# Patient Record
Sex: FEMALE | Race: WHITE | NOT HISPANIC OR LATINO | Employment: OTHER | URBAN - METROPOLITAN AREA
[De-identification: names, ages, dates, MRNs, and addresses within clinical notes are randomized per-mention and may not be internally consistent; named-entity substitution may affect disease eponyms.]

---

## 2022-11-07 ENCOUNTER — LAB REQUISITION (OUTPATIENT)
Dept: LAB | Facility: CLINIC | Age: 87
End: 2022-11-07
Payer: COMMERCIAL

## 2022-11-07 ENCOUNTER — TRANSITIONAL CARE UNIT VISIT (OUTPATIENT)
Dept: GERIATRICS | Facility: CLINIC | Age: 87
End: 2022-11-07
Payer: COMMERCIAL

## 2022-11-07 VITALS
HEART RATE: 76 BPM | WEIGHT: 157 LBS | TEMPERATURE: 97.1 F | RESPIRATION RATE: 18 BRPM | SYSTOLIC BLOOD PRESSURE: 120 MMHG | DIASTOLIC BLOOD PRESSURE: 47 MMHG | OXYGEN SATURATION: 94 %

## 2022-11-07 DIAGNOSIS — N30.00 ACUTE CYSTITIS WITHOUT HEMATURIA: ICD-10-CM

## 2022-11-07 DIAGNOSIS — R53.81 PHYSICAL DECONDITIONING: ICD-10-CM

## 2022-11-07 DIAGNOSIS — R52 PAIN MANAGEMENT: Primary | ICD-10-CM

## 2022-11-07 DIAGNOSIS — S32.599D OTHER SPECIFIED FRACTURE OF UNSPECIFIED PUBIS, SUBSEQUENT ENCOUNTER FOR FRACTURE WITH ROUTINE HEALING: ICD-10-CM

## 2022-11-07 PROCEDURE — 99310 SBSQ NF CARE HIGH MDM 45: CPT | Performed by: NURSE PRACTITIONER

## 2022-11-07 RX ORDER — CIPROFLOXACIN 250 MG/1
250 TABLET, FILM COATED ORAL 2 TIMES DAILY
COMMUNITY
Start: 2022-11-04 | End: 2022-11-08

## 2022-11-07 RX ORDER — ASPIRIN 81 MG/1
81 TABLET, CHEWABLE ORAL 2 TIMES DAILY
COMMUNITY
Start: 2022-11-04

## 2022-11-07 RX ORDER — OXYCODONE HYDROCHLORIDE 5 MG/1
2.5 TABLET ORAL EVERY 4 HOURS PRN
COMMUNITY
Start: 2022-11-04 | End: 2022-11-10

## 2022-11-07 RX ORDER — OMEPRAZOLE 20 MG/1
20 TABLET, DELAYED RELEASE ORAL 2 TIMES DAILY
COMMUNITY
Start: 2022-11-04

## 2022-11-07 RX ORDER — MIRTAZAPINE 15 MG/1
3.75 TABLET, FILM COATED ORAL AT BEDTIME
COMMUNITY

## 2022-11-07 RX ORDER — ACETAMINOPHEN 500 MG
1000 TABLET ORAL EVERY 6 HOURS PRN
COMMUNITY
Start: 2022-11-04

## 2022-11-07 NOTE — LETTER
11/7/2022        RE: Henrietta Mitchell  [address] [place] [address]        [facility] GERIATRIC SERVICES  Chief Complaint   Patient presents with     [facility] F/U     [facility] Medical Record Number:  [id number]  Place of Service where encounter took place:  No question data found.  Code Status:  DNR    HISTORY:      HPI:  Henrietta Mitchell  is 89 year old (11/30/1927) undergoing physical and occupational therapy. She is  with a history of osteoporosis and MGUS admitted 10/31/2022 following mechanical fall. She was diagnosed with a pelvic ring fracture and right humerus fracture. The humerus was splinted in the ED. Ortho saw her and felt both fractures were not amenable to surgical fixation.    She developed some urinary retention while on narcotics, and dysuria later on. UA was abnormal and she was started on Ciprofloxacin for acute cystitis.       Today she was seen to review VS, Labs, Routine visit and to establish care.  She denied chest pain shortness of breath cough or congestion.  She reports no BM x5 days.  She has no abdominal distention or discomfort positive flatus.  She was started on senna S1 tablet p.o. twice daily and also as needed suppositories.  She denies any numbness tingling 3+ pulse right upper extremity she will complete Cipro on 11/ 8 due to cystitis.    ALLERGIES:Amoxicillin, Cefuroxime, Cephalexin, Zoledronic acid, Penicillins, Sulfabenzamide, and Tetracycline    PAST MEDICAL HISTORY: No past medical history on file.    PAST SURGICAL HISTORY:   has no past surgical history on file.    FAMILY HISTORY: family history is not on file.    SOCIAL HISTORY:      ROS:  Constitutional: Negative for activity change, appetite change, fatigue and fever.  Nonweightbearing right upper extremity  HENT: Negative for congestion.    Respiratory: Negative for cough, shortness of breath and wheezing.    Cardiovascular: Negative for chest pain and leg swelling.   Gastrointestinal: Negative for  abdominal distention, abdominal pain,  positive constipation, diarrhea and nausea.   Genitourinary: Negative for dysuria.   Musculoskeletal: Positive for arthralgia. Negative for back pain.   Skin: Negative for color change and wound.   Neurological: Negative for dizziness.   Psychiatric/Behavioral: Negative for agitation, behavioral problems and confusion.     Physical Exam:  Constitutional:       Appearance: Patient is well-developed.   HENT:      Head: Normocephalic.   Eyes:      Conjunctiva/sclera: Conjunctivae normal.   Neck:      Musculoskeletal: Normal range of motion.   Cardiovascular:      Rate and Rhythm: Normal rate and regular rhythm.      Heart sounds: Normal heart sounds. No murmur.   Pulmonary:      Effort: No respiratory distress.      Breath sounds: Normal breath sounds. No wheezing or rales.   Abdominal:      General: Bowel sounds are normal. There is no distension.      Palpations: Abdomen is soft.      Tenderness: There is no abdominal tenderness.   Musculoskeletal:       Normal range of motion.     Skin:General:        Skin is warm.   Neurological:         Mental Status: Patient is alert and oriented to person, place, and time.   Psychiatric:         Behavior: Behavior normal.     Vitals:/47   Pulse 76   Temp 97.1  F (36.2  C)   Resp 18   Wt 71.2 kg (157 lb)   SpO2 94%  and There is no height or weight on file to calculate BMI.    Lab/Diagnostic data:   Recent Results (from the past 240 hour(s))   COVID-19 VIRUS (CORONAVIRUS) BY PCR (EXTERNAL RESULT)    Collection Time: 10/31/22  6:18 PM   Result Value Ref Range    COVID-19 Virus by PCR (External Result) Negative Negative       MEDICATIONS:     Review of your medicines          Accurate as of November 7, 2022 12:04 PM. If you have any questions, ask your nurse or doctor.            CONTINUE these medicines which have NOT CHANGED      Dose / Directions   acetaminophen 500 MG tablet  Commonly known as: TYLENOL      Dose: 1,000 mg  Take  1,000 mg by mouth every 6 hours as needed  Refills: 0     aspirin 81 MG chewable tablet  Commonly known as: ASA      Dose: 81 mg  Take 81 mg by mouth 2 times daily  Refills: 0     ciprofloxacin 250 MG tablet  Commonly known as: CIPRO      Dose: 250 mg  Take 250 mg by mouth 2 times daily  Refills: 0     mirtazapine 15 MG tablet  Commonly known as: REMERON      Dose: 3.75 mg  Take 3.75 mg by mouth daily  Refills: 0     omeprazole 20 MG EC tablet  Commonly known as: priLOSEC OTC      Dose: 20 mg  Take 20 mg by mouth 2 times daily  Refills: 0     oxyCODONE 5 MG tablet  Commonly known as: ROXICODONE      Dose: 2.5 mg  Take 2.5 mg by mouth every 4 hours as needed  Refills: 0            ASSESSMENT/PLAN  Encounter Diagnoses   Name Primary?     Pain management Yes     Physical deconditioning      Acute cystitis without hematuria      Right humerus fracture- non operative, Follow up with Orthopedics and pain management     Pain management Tylenol PRN, PRN Oxycodone    Anxiety- PRN Remeron       Physical deconditioning PT/OT    Cystitis- Continue Ciprofloxacin until 11/8/22    GERD- On Omeprazole      Electronically signed by: Cailin Delarosa CNP        Sincerely,        Cailin Delarosa CNP

## 2022-11-07 NOTE — PROGRESS NOTES
[facility] GERIATRIC SERVICES  Chief Complaint   Patient presents with     [facility] F/U     [facility] Medical Record Number:  [id number]  Place of Service where encounter took place:  No question data found.  Code Status:  DNR    HISTORY:      HPI:  Henrietta Mitchell  is 89 year old (11/30/1927) undergoing physical and occupational therapy. She is  with a history of osteoporosis and MGUS admitted 10/31/2022 following mechanical fall. She was diagnosed with a pelvic ring fracture and right humerus fracture. The humerus was splinted in the ED. Ortho saw her and felt both fractures were not amenable to surgical fixation.    She developed some urinary retention while on narcotics, and dysuria later on. UA was abnormal and she was started on Ciprofloxacin for acute cystitis.       Today she was seen to review VS, Labs, Routine visit and to establish care.  She denied chest pain shortness of breath cough or congestion.  She reports no BM x5 days.  She has no abdominal distention or discomfort positive flatus.  She was started on senna S1 tablet p.o. twice daily and also as needed suppositories.  She denies any numbness tingling 3+ pulse right upper extremity she will complete Cipro on 11/ 8 due to cystitis.    ALLERGIES:Amoxicillin, Cefuroxime, Cephalexin, Zoledronic acid, Penicillins, Sulfabenzamide, and Tetracycline    PAST MEDICAL HISTORY: No past medical history on file.    PAST SURGICAL HISTORY:   has no past surgical history on file.    FAMILY HISTORY: family history is not on file.    SOCIAL HISTORY:      ROS:  Constitutional: Negative for activity change, appetite change, fatigue and fever.  Nonweightbearing right upper extremity  HENT: Negative for congestion.    Respiratory: Negative for cough, shortness of breath and wheezing.    Cardiovascular: Negative for chest pain and leg swelling.   Gastrointestinal: Negative for abdominal distention, abdominal pain,  positive constipation, diarrhea and nausea.   Genitourinary:  Negative for dysuria.   Musculoskeletal: Positive for arthralgia. Negative for back pain.   Skin: Negative for color change and wound.   Neurological: Negative for dizziness.   Psychiatric/Behavioral: Negative for agitation, behavioral problems and confusion.     Physical Exam:  Constitutional:       Appearance: Patient is well-developed.   HENT:      Head: Normocephalic.   Eyes:      Conjunctiva/sclera: Conjunctivae normal.   Neck:      Musculoskeletal: Normal range of motion.   Cardiovascular:      Rate and Rhythm: Normal rate and regular rhythm.      Heart sounds: Normal heart sounds. No murmur.   Pulmonary:      Effort: No respiratory distress.      Breath sounds: Normal breath sounds. No wheezing or rales.   Abdominal:      General: Bowel sounds are normal. There is no distension.      Palpations: Abdomen is soft.      Tenderness: There is no abdominal tenderness.   Musculoskeletal:       Normal range of motion.     Skin:General:        Skin is warm.   Neurological:         Mental Status: Patient is alert and oriented to person, place, and time.   Psychiatric:         Behavior: Behavior normal.     Vitals:/47   Pulse 76   Temp 97.1  F (36.2  C)   Resp 18   Wt 71.2 kg (157 lb)   SpO2 94%  and There is no height or weight on file to calculate BMI.    Lab/Diagnostic data:   Recent Results (from the past 240 hour(s))   COVID-19 VIRUS (CORONAVIRUS) BY PCR (EXTERNAL RESULT)    Collection Time: 10/31/22  6:18 PM   Result Value Ref Range    COVID-19 Virus by PCR (External Result) Negative Negative       MEDICATIONS:     Review of your medicines          Accurate as of November 7, 2022 12:04 PM. If you have any questions, ask your nurse or doctor.            CONTINUE these medicines which have NOT CHANGED      Dose / Directions   acetaminophen 500 MG tablet  Commonly known as: TYLENOL      Dose: 1,000 mg  Take 1,000 mg by mouth every 6 hours as needed  Refills: 0     aspirin 81 MG chewable tablet  Commonly  known as: ASA      Dose: 81 mg  Take 81 mg by mouth 2 times daily  Refills: 0     ciprofloxacin 250 MG tablet  Commonly known as: CIPRO      Dose: 250 mg  Take 250 mg by mouth 2 times daily  Refills: 0     mirtazapine 15 MG tablet  Commonly known as: REMERON      Dose: 3.75 mg  Take 3.75 mg by mouth daily  Refills: 0     omeprazole 20 MG EC tablet  Commonly known as: priLOSEC OTC      Dose: 20 mg  Take 20 mg by mouth 2 times daily  Refills: 0     oxyCODONE 5 MG tablet  Commonly known as: ROXICODONE      Dose: 2.5 mg  Take 2.5 mg by mouth every 4 hours as needed  Refills: 0            ASSESSMENT/PLAN  Encounter Diagnoses   Name Primary?     Pain management Yes     Physical deconditioning      Acute cystitis without hematuria      Right humerus fracture- non operative, Follow up with Orthopedics and pain management     Pain management Tylenol PRN, PRN Oxycodone    Anxiety- PRN Remeron       Physical deconditioning PT/OT    Cystitis- Continue Ciprofloxacin until 11/8/22    GERD- On Omeprazole      Electronically signed by: Cailin Delarosa CNP

## 2022-11-08 ENCOUNTER — TELEPHONE (OUTPATIENT)
Dept: GERIATRICS | Facility: CLINIC | Age: 87
End: 2022-11-08

## 2022-11-08 LAB
ANION GAP SERPL CALCULATED.3IONS-SCNC: 17 MMOL/L (ref 7–15)
BASOPHILS # BLD AUTO: 0.1 10E3/UL (ref 0–0.2)
BASOPHILS NFR BLD AUTO: 1 %
BUN SERPL-MCNC: 19.5 MG/DL (ref 8–23)
CALCIUM SERPL-MCNC: 8.8 MG/DL (ref 8.2–9.6)
CHLORIDE SERPL-SCNC: 100 MMOL/L (ref 98–107)
CREAT SERPL-MCNC: 0.68 MG/DL (ref 0.51–0.95)
DEPRECATED HCO3 PLAS-SCNC: 17 MMOL/L (ref 22–29)
EOSINOPHIL # BLD AUTO: 0.1 10E3/UL (ref 0–0.7)
EOSINOPHIL NFR BLD AUTO: 2 %
ERYTHROCYTE [DISTWIDTH] IN BLOOD BY AUTOMATED COUNT: 13.3 % (ref 10–15)
GFR SERPL CREATININE-BSD FRML MDRD: 80 ML/MIN/1.73M2
GLUCOSE SERPL-MCNC: 168 MG/DL (ref 70–99)
HCT VFR BLD AUTO: 34.5 % (ref 35–47)
HGB BLD-MCNC: 10.9 G/DL (ref 11.7–15.7)
IMM GRANULOCYTES # BLD: 0 10E3/UL
IMM GRANULOCYTES NFR BLD: 1 %
LYMPHOCYTES # BLD AUTO: 1 10E3/UL (ref 0.8–5.3)
LYMPHOCYTES NFR BLD AUTO: 15 %
MAGNESIUM SERPL-MCNC: 2.3 MG/DL (ref 1.7–2.3)
MCH RBC QN AUTO: 31.8 PG (ref 26.5–33)
MCHC RBC AUTO-ENTMCNC: 31.6 G/DL (ref 31.5–36.5)
MCV RBC AUTO: 101 FL (ref 78–100)
MONOCYTES # BLD AUTO: 0.8 10E3/UL (ref 0–1.3)
MONOCYTES NFR BLD AUTO: 11 %
NEUTROPHILS # BLD AUTO: 4.8 10E3/UL (ref 1.6–8.3)
NEUTROPHILS NFR BLD AUTO: 70 %
NRBC # BLD AUTO: 0 10E3/UL
NRBC BLD AUTO-RTO: 0 /100
PLATELET # BLD AUTO: 494 10E3/UL (ref 150–450)
POTASSIUM SERPL-SCNC: 4.7 MMOL/L (ref 3.4–5.3)
RBC # BLD AUTO: 3.43 10E6/UL (ref 3.8–5.2)
SODIUM SERPL-SCNC: 134 MMOL/L (ref 136–145)
WBC # BLD AUTO: 6.7 10E3/UL (ref 4–11)

## 2022-11-08 PROCEDURE — 36415 COLL VENOUS BLD VENIPUNCTURE: CPT | Mod: ORL | Performed by: NURSE PRACTITIONER

## 2022-11-08 PROCEDURE — 83735 ASSAY OF MAGNESIUM: CPT | Mod: ORL | Performed by: NURSE PRACTITIONER

## 2022-11-08 PROCEDURE — 85025 COMPLETE CBC W/AUTO DIFF WBC: CPT | Mod: ORL | Performed by: NURSE PRACTITIONER

## 2022-11-08 PROCEDURE — 80048 BASIC METABOLIC PNL TOTAL CA: CPT | Mod: ORL | Performed by: NURSE PRACTITIONER

## 2022-11-08 PROCEDURE — P9604 ONE-WAY ALLOW PRORATED TRIP: HCPCS | Mod: ORL | Performed by: NURSE PRACTITIONER

## 2022-11-08 NOTE — TELEPHONE ENCOUNTER
"[facility] Geriatrics Lab Note     Provider: PAMELA Silvestre  Facility: [facility] Type:  TCU    Allergies   Allergen Reactions     Amoxicillin      Cefuroxime      Other reaction(s): *Unknown  Pt does not remember, but says \"it made me sick\" in 2018 and 2019     Cephalexin      Other reaction(s): Stomach Upset     Zoledronic Acid Other (See Comments)     Bladder Problems      Penicillins Hives and Rash     Sulfabenzamide Rash     Tetracycline Rash       Labs Reviewed by provider: Heme 1, BMP, Mg     Verbal Order/Direction given by Provider: No new orders.      Provider giving Order:  PAMELA Silvestre    Verbal Order given to: [place]([phone])    Tyler Watt RN  "

## 2022-11-10 DIAGNOSIS — R52 PAIN MANAGEMENT: ICD-10-CM

## 2022-11-10 DIAGNOSIS — R52 PAIN: Primary | ICD-10-CM

## 2022-11-10 RX ORDER — OXYCODONE HYDROCHLORIDE 5 MG/1
2.5 TABLET ORAL EVERY 4 HOURS PRN
Qty: 42 TABLET | Refills: 0 | Status: SHIPPED | OUTPATIENT
Start: 2022-11-10

## 2022-11-11 ENCOUNTER — TRANSITIONAL CARE UNIT VISIT (OUTPATIENT)
Dept: GERIATRICS | Facility: CLINIC | Age: 87
End: 2022-11-11
Payer: COMMERCIAL

## 2022-11-11 VITALS
RESPIRATION RATE: 18 BRPM | HEART RATE: 68 BPM | OXYGEN SATURATION: 95 % | SYSTOLIC BLOOD PRESSURE: 130 MMHG | DIASTOLIC BLOOD PRESSURE: 49 MMHG | TEMPERATURE: 96.6 F | WEIGHT: 157 LBS | BODY MASS INDEX: 26.16 KG/M2 | HEIGHT: 65 IN

## 2022-11-11 DIAGNOSIS — R52 PAIN MANAGEMENT: ICD-10-CM

## 2022-11-11 DIAGNOSIS — R52 PAIN: Primary | ICD-10-CM

## 2022-11-11 PROBLEM — N32.81 OVERACTIVE BLADDER: Status: ACTIVE | Noted: 2017-09-19
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
## 2022-11-11 PROBLEM — S32.591D CLOSED FRACTURE OF MULTIPLE PUBIC RAMI, RIGHT, WITH ROUTINE HEALING, SUBSEQUENT ENCOUNTER: Status: ACTIVE | Noted: 2022-11-08

## 2022-11-11 PROBLEM — R26.89 BALANCE PROBLEM: Status: ACTIVE | Noted: 2020-12-16

## 2022-11-11 PROBLEM — M17.0 PRIMARY OSTEOARTHRITIS OF BOTH KNEES: Status: ACTIVE | Noted: 2017-06-12

## 2022-11-11 PROBLEM — M54.12 CERVICAL RADICULOPATHY AT C7: Status: ACTIVE | Noted: 2019-09-12

## 2022-11-11 PROBLEM — S42.201D: Status: ACTIVE | Noted: 2022-11-08

## 2022-11-11 PROBLEM — R09.82 POST-NASAL DRIP: Status: ACTIVE | Noted: 2019-09-16

## 2022-11-11 PROBLEM — R13.19 ESOPHAGEAL DYSPHAGIA: Status: ACTIVE | Noted: 2022-11-11

## 2022-11-11 PROBLEM — S93.402A SPRAIN OF LEFT ANKLE: Status: ACTIVE | Noted: 2020-12-04

## 2022-11-11 PROBLEM — D47.2 MONOCLONAL GAMMOPATHY PRESENT ON SERUM PROTEIN ELECTROPHORESIS: Status: ACTIVE | Noted: 2021-07-18

## 2022-11-11 PROBLEM — J92.9 PLEURAL PLAQUE: Status: ACTIVE | Noted: 2019-09-16

## 2022-11-11 PROBLEM — I44.0 FIRST DEGREE AV BLOCK: Status: ACTIVE | Noted: 2018-09-20

## 2022-11-11 PROBLEM — K44.9 HIATAL HERNIA: Status: ACTIVE | Noted: 2020-06-22

## 2022-11-11 PROBLEM — E53.8 VITAMIN B 12 DEFICIENCY: Status: ACTIVE | Noted: 2021-07-18

## 2022-11-11 PROBLEM — M17.12 PRIMARY OSTEOARTHRITIS OF LEFT KNEE: Status: ACTIVE | Noted: 2021-02-18

## 2022-11-11 PROBLEM — R30.0 DYSURIA: Status: ACTIVE | Noted: 2021-03-29

## 2022-11-11 PROBLEM — I45.10 RBBB: Status: ACTIVE | Noted: 2018-09-20

## 2022-11-11 PROBLEM — M71.30 MYXOID CYST: Status: ACTIVE | Noted: 2018-06-06

## 2022-11-11 PROBLEM — S42.211A CLOSED DISPLACED FRACTURE OF SURGICAL NECK OF RIGHT HUMERUS: Status: ACTIVE | Noted: 2022-10-31

## 2022-11-11 PROCEDURE — 99309 SBSQ NF CARE MODERATE MDM 30: CPT | Performed by: NURSE PRACTITIONER

## 2022-11-11 RX ORDER — BISACODYL 10 MG
10 SUPPOSITORY, RECTAL RECTAL DAILY PRN
COMMUNITY

## 2022-11-11 NOTE — LETTER
11/11/2022        RE: Henrietta Mitchell  [address] [place] [address]        [facility] GERIATRIC SERVICES  Chief Complaint   Patient presents with     [facility]     [facility] Medical Record Number:  [id number]  Place of Service where encounter took place:  [facility] [[id number]]  Code Status:  DNR    HISTORY:      HPI:  Henrietta Mitchell  is 89 year old (11/30/1927) undergoing physical and occupational therapy. She is  with a history of osteoporosis and MGUS admitted 10/31/2022 following mechanical fall. She was diagnosed with a pelvic ring fracture and right humerus fracture. The humerus was splinted in the ED. Ortho saw her and felt both fractures were not amenable to surgical fixation.    She developed some urinary retention while on narcotics, and dysuria later on. UA was abnormal and she was started on Ciprofloxacin for acute cystitis.       Today she was seen to review VS, Labs, Routine visit and for reports of increased pain.  Nursing reports that patient has been having increased pain and reports she takes 5 mg of oxycodone at home and not 2.5.  Writer met with her at the bedside and at this time she was not having any pain lying down.  It appears her pain may be more related to restless leg syndrome.  She has had a couple of episodes where her leg jerks in the middle of the night only.  She did not want to start medications at this time.  She denied chest pain shortness of breath cough or congestion.  She is now moving her bowels.  She denies any numbness tingling 3+ pulse right upper extremity she completed Cipro on 11/ 8 due to cystitis.    ALLERGIES:Amoxicillin; Cefuroxime; Cephalexin; Mannitol; Penicillin g; Water, sterile; Zoledronic acid; Penicillins; Sulfabenzamide; and Tetracycline    PAST MEDICAL HISTORY: History reviewed. No pertinent past medical history.    PAST SURGICAL HISTORY:   has no past surgical history on file.    FAMILY HISTORY: family history is not on  "file.    SOCIAL HISTORY:      ROS:  Constitutional: Negative for activity change, appetite change, fatigue and fever.  Nonweightbearing right upper extremity  HENT: Negative for congestion.    Respiratory: Negative for cough, shortness of breath and wheezing.    Cardiovascular: Negative for chest pain and leg swelling.   Gastrointestinal: Negative for abdominal distention, abdominal pain,  positive constipation, diarrhea and nausea.   Genitourinary: Negative for dysuria.   Musculoskeletal: Positive for arthralgia. Negative for back pain.   Skin: Negative for color change and wound.   Neurological: Negative for dizziness.   Psychiatric/Behavioral: Negative for agitation, behavioral problems and confusion.     Physical Exam:  Constitutional:       Appearance: Patient is well-developed.   HENT:      Head: Normocephalic.   Eyes:      Conjunctiva/sclera: Conjunctivae normal.   Neck:      Musculoskeletal: Normal range of motion.   Cardiovascular:      Rate and Rhythm: Normal rate and regular rhythm.      Heart sounds: Normal heart sounds. No murmur.   Pulmonary:      Effort: No respiratory distress.      Breath sounds: Normal breath sounds. No wheezing or rales.   Abdominal:      General: Bowel sounds are normal. There is no distension.      Palpations: Abdomen is soft.      Tenderness: There is no abdominal tenderness.   Musculoskeletal:       Normal range of motion.     Skin:General:        Skin is warm.   Neurological:         Mental Status: Patient is alert and oriented to person, place, and time.   Psychiatric:         Behavior: Behavior normal.     Vitals:/49   Pulse 68   Temp (!) 96.6  F (35.9  C)   Resp 18   Ht 1.651 m (5' 5\")   Wt 71.2 kg (157 lb)   SpO2 95%   BMI 26.13 kg/m   and Body mass index is 26.13 kg/m .    Lab/Diagnostic data:   Recent Results (from the past 240 hour(s))   Basic metabolic panel    Collection Time: 11/08/22  9:46 AM   Result Value Ref Range    Sodium 134 (L) 136 - 145 mmol/L "    Potassium 4.7 3.4 - 5.3 mmol/L    Chloride 100 98 - 107 mmol/L    Carbon Dioxide (CO2) 17 (L) 22 - 29 mmol/L    Anion Gap 17 (H) 7 - 15 mmol/L    Urea Nitrogen 19.5 8.0 - 23.0 mg/dL    Creatinine 0.68 0.51 - 0.95 mg/dL    Calcium 8.8 8.2 - 9.6 mg/dL    Glucose 168 (H) 70 - 99 mg/dL    GFR Estimate 80 >60 mL/min/1.73m2   Magnesium    Collection Time: 11/08/22  9:46 AM   Result Value Ref Range    Magnesium 2.3 1.7 - 2.3 mg/dL   CBC with platelets and differential    Collection Time: 11/08/22  9:46 AM   Result Value Ref Range    WBC Count 6.7 4.0 - 11.0 10e3/uL    RBC Count 3.43 (L) 3.80 - 5.20 10e6/uL    Hemoglobin 10.9 (L) 11.7 - 15.7 g/dL    Hematocrit 34.5 (L) 35.0 - 47.0 %     (H) 78 - 100 fL    MCH 31.8 26.5 - 33.0 pg    MCHC 31.6 31.5 - 36.5 g/dL    RDW 13.3 10.0 - 15.0 %    Platelet Count 494 (H) 150 - 450 10e3/uL    % Neutrophils 70 %    % Lymphocytes 15 %    % Monocytes 11 %    % Eosinophils 2 %    % Basophils 1 %    % Immature Granulocytes 1 %    NRBCs per 100 WBC 0 <1 /100    Absolute Neutrophils 4.8 1.6 - 8.3 10e3/uL    Absolute Lymphocytes 1.0 0.8 - 5.3 10e3/uL    Absolute Monocytes 0.8 0.0 - 1.3 10e3/uL    Absolute Eosinophils 0.1 0.0 - 0.7 10e3/uL    Absolute Basophils 0.1 0.0 - 0.2 10e3/uL    Absolute Immature Granulocytes 0.0 <=0.4 10e3/uL    Absolute NRBCs 0.0 10e3/uL       MEDICATIONS:     Review of your medicines          Accurate as of November 11, 2022 11:59 PM. If you have any questions, ask your nurse or doctor.            CONTINUE these medicines which have NOT CHANGED      Dose / Directions   acetaminophen 500 MG tablet  Commonly known as: TYLENOL      Dose: 1,000 mg  Take 1,000 mg by mouth every 6 hours as needed  Refills: 0     aspirin 81 MG chewable tablet  Commonly known as: ASA      Dose: 81 mg  Take 81 mg by mouth 2 times daily  Refills: 0     bisacodyl 10 MG suppository  Commonly known as: DULCOLAX      Dose: 10 mg  Place 10 mg rectally daily as needed for  constipation  Refills: 0     mirtazapine 15 MG tablet  Commonly known as: REMERON      Dose: 3.75 mg  Take 3.75 mg by mouth daily  Refills: 0     omeprazole 20 MG EC tablet  Commonly known as: priLOSEC OTC      Dose: 20 mg  Take 20 mg by mouth 2 times daily  Refills: 0     oxyCODONE 5 MG tablet  Commonly known as: ROXICODONE  Used for: Pain management      Dose: 2.5 mg  Take 0.5 tablets (2.5 mg) by mouth every 4 hours as needed  Quantity: 42 tablet  Refills: 0            ASSESSMENT/PLAN  Encounter Diagnoses   Name Primary?     Pain Yes     Pain management      Right humerus fracture- non operative, Follow up with Orthopedics and pain management     Pain management Tylenol PRN, PRN Oxycodone    Anxiety- PRN Remeron       Physical deconditioning PT/OT    Cystitis-completed ciprofloxacin until 11/8/22    GERD- On Omeprazole      Electronically signed by: Cailin Delarosa CNP        Sincerely,        Cailin Delarosa CNP

## 2022-11-12 NOTE — PROGRESS NOTES
[facility] GERIATRIC SERVICES  Chief Complaint   Patient presents with     MILADY     [place] Medical Record Number:  [id number]  Place of Service where encounter took place:  [facility] ([facility]) [[id number]]  Code Status:  DNR    HISTORY:      HPI:  Henrietta Mitchell  is 89 year old (11/30/1927) undergoing physical and occupational therapy. She is  with a history of osteoporosis and MGUS admitted 10/31/2022 following mechanical fall. She was diagnosed with a pelvic ring fracture and right humerus fracture. The humerus was splinted in the ED. Ortho saw her and felt both fractures were not amenable to surgical fixation.    She developed some urinary retention while on narcotics, and dysuria later on. UA was abnormal and she was started on Ciprofloxacin for acute cystitis.       Today she was seen to review VS, Labs, Routine visit and for reports of increased pain.  Nursing reports that patient has been having increased pain and reports she takes 5 mg of oxycodone at home and not 2.5.  Writer met with her at the bedside and at this time she was not having any pain lying down.  It appears her pain may be more related to restless leg syndrome.  She has had a couple of episodes where her leg jerks in the middle of the night only.  She did not want to start medications at this time.  She denied chest pain shortness of breath cough or congestion.  She is now moving her bowels.  She denies any numbness tingling 3+ pulse right upper extremity she completed Cipro on 11/ 8 due to cystitis.    ALLERGIES:Amoxicillin; Cefuroxime; Cephalexin; Mannitol; Penicillin g; Water, sterile; Zoledronic acid; Penicillins; Sulfabenzamide; and Tetracycline    PAST MEDICAL HISTORY: History reviewed. No pertinent past medical history.    PAST SURGICAL HISTORY:   has no past surgical history on file.    FAMILY HISTORY: family history is not on file.    SOCIAL HISTORY:      ROS:  Constitutional: Negative for activity change, appetite change,  "fatigue and fever.  Nonweightbearing right upper extremity  HENT: Negative for congestion.    Respiratory: Negative for cough, shortness of breath and wheezing.    Cardiovascular: Negative for chest pain and leg swelling.   Gastrointestinal: Negative for abdominal distention, abdominal pain,  positive constipation, diarrhea and nausea.   Genitourinary: Negative for dysuria.   Musculoskeletal: Positive for arthralgia. Negative for back pain.   Skin: Negative for color change and wound.   Neurological: Negative for dizziness.   Psychiatric/Behavioral: Negative for agitation, behavioral problems and confusion.     Physical Exam:  Constitutional:       Appearance: Patient is well-developed.   HENT:      Head: Normocephalic.   Eyes:      Conjunctiva/sclera: Conjunctivae normal.   Neck:      Musculoskeletal: Normal range of motion.   Cardiovascular:      Rate and Rhythm: Normal rate and regular rhythm.      Heart sounds: Normal heart sounds. No murmur.   Pulmonary:      Effort: No respiratory distress.      Breath sounds: Normal breath sounds. No wheezing or rales.   Abdominal:      General: Bowel sounds are normal. There is no distension.      Palpations: Abdomen is soft.      Tenderness: There is no abdominal tenderness.   Musculoskeletal:       Normal range of motion.     Skin:General:        Skin is warm.   Neurological:         Mental Status: Patient is alert and oriented to person, place, and time.   Psychiatric:         Behavior: Behavior normal.     Vitals:/49   Pulse 68   Temp (!) 96.6  F (35.9  C)   Resp 18   Ht 1.651 m (5' 5\")   Wt 71.2 kg (157 lb)   SpO2 95%   BMI 26.13 kg/m   and Body mass index is 26.13 kg/m .    Lab/Diagnostic data:   Recent Results (from the past 240 hour(s))   Basic metabolic panel    Collection Time: 11/08/22  9:46 AM   Result Value Ref Range    Sodium 134 (L) 136 - 145 mmol/L    Potassium 4.7 3.4 - 5.3 mmol/L    Chloride 100 98 - 107 mmol/L    Carbon Dioxide (CO2) 17 (L) 22 " - 29 mmol/L    Anion Gap 17 (H) 7 - 15 mmol/L    Urea Nitrogen 19.5 8.0 - 23.0 mg/dL    Creatinine 0.68 0.51 - 0.95 mg/dL    Calcium 8.8 8.2 - 9.6 mg/dL    Glucose 168 (H) 70 - 99 mg/dL    GFR Estimate 80 >60 mL/min/1.73m2   Magnesium    Collection Time: 11/08/22  9:46 AM   Result Value Ref Range    Magnesium 2.3 1.7 - 2.3 mg/dL   CBC with platelets and differential    Collection Time: 11/08/22  9:46 AM   Result Value Ref Range    WBC Count 6.7 4.0 - 11.0 10e3/uL    RBC Count 3.43 (L) 3.80 - 5.20 10e6/uL    Hemoglobin 10.9 (L) 11.7 - 15.7 g/dL    Hematocrit 34.5 (L) 35.0 - 47.0 %     (H) 78 - 100 fL    MCH 31.8 26.5 - 33.0 pg    MCHC 31.6 31.5 - 36.5 g/dL    RDW 13.3 10.0 - 15.0 %    Platelet Count 494 (H) 150 - 450 10e3/uL    % Neutrophils 70 %    % Lymphocytes 15 %    % Monocytes 11 %    % Eosinophils 2 %    % Basophils 1 %    % Immature Granulocytes 1 %    NRBCs per 100 WBC 0 <1 /100    Absolute Neutrophils 4.8 1.6 - 8.3 10e3/uL    Absolute Lymphocytes 1.0 0.8 - 5.3 10e3/uL    Absolute Monocytes 0.8 0.0 - 1.3 10e3/uL    Absolute Eosinophils 0.1 0.0 - 0.7 10e3/uL    Absolute Basophils 0.1 0.0 - 0.2 10e3/uL    Absolute Immature Granulocytes 0.0 <=0.4 10e3/uL    Absolute NRBCs 0.0 10e3/uL       MEDICATIONS:     Review of your medicines          Accurate as of November 11, 2022 11:59 PM. If you have any questions, ask your nurse or doctor.            CONTINUE these medicines which have NOT CHANGED      Dose / Directions   acetaminophen 500 MG tablet  Commonly known as: TYLENOL      Dose: 1,000 mg  Take 1,000 mg by mouth every 6 hours as needed  Refills: 0     aspirin 81 MG chewable tablet  Commonly known as: ASA      Dose: 81 mg  Take 81 mg by mouth 2 times daily  Refills: 0     bisacodyl 10 MG suppository  Commonly known as: DULCOLAX      Dose: 10 mg  Place 10 mg rectally daily as needed for constipation  Refills: 0     mirtazapine 15 MG tablet  Commonly known as: REMERON      Dose: 3.75 mg  Take 3.75 mg by  mouth daily  Refills: 0     omeprazole 20 MG EC tablet  Commonly known as: priLOSEC OTC      Dose: 20 mg  Take 20 mg by mouth 2 times daily  Refills: 0     oxyCODONE 5 MG tablet  Commonly known as: ROXICODONE  Used for: Pain management      Dose: 2.5 mg  Take 0.5 tablets (2.5 mg) by mouth every 4 hours as needed  Quantity: 42 tablet  Refills: 0            ASSESSMENT/PLAN  Encounter Diagnoses   Name Primary?     Pain Yes     Pain management      Right humerus fracture- non operative, Follow up with Orthopedics and pain management     Pain management Tylenol PRN, PRN Oxycodone    Anxiety- PRN Remeron       Physical deconditioning PT/OT    Cystitis-completed ciprofloxacin until 11/8/22    GERD- On Omeprazole      Electronically signed by: Cailin Delarosa CNP

## 2022-11-16 ENCOUNTER — TRANSITIONAL CARE UNIT VISIT (OUTPATIENT)
Dept: GERIATRICS | Facility: CLINIC | Age: 87
End: 2022-11-16
Payer: COMMERCIAL

## 2022-11-16 VITALS
RESPIRATION RATE: 18 BRPM | TEMPERATURE: 96.8 F | HEART RATE: 67 BPM | OXYGEN SATURATION: 99 % | WEIGHT: 157 LBS | SYSTOLIC BLOOD PRESSURE: 111 MMHG | BODY MASS INDEX: 26.16 KG/M2 | DIASTOLIC BLOOD PRESSURE: 64 MMHG | HEIGHT: 65 IN

## 2022-11-16 DIAGNOSIS — R53.81 PHYSICAL DECONDITIONING: Primary | ICD-10-CM

## 2022-11-16 DIAGNOSIS — S32.591D CLOSED FRACTURE OF MULTIPLE PUBIC RAMI, RIGHT, WITH ROUTINE HEALING, SUBSEQUENT ENCOUNTER: ICD-10-CM

## 2022-11-16 PROCEDURE — 99309 SBSQ NF CARE MODERATE MDM 30: CPT | Performed by: NURSE PRACTITIONER

## 2022-11-16 RX ORDER — AMOXICILLIN 250 MG
1 CAPSULE ORAL 2 TIMES DAILY
COMMUNITY

## 2022-11-16 NOTE — PROGRESS NOTES
[facility] GERIATRIC SERVICES  Chief Complaint   Patient presents with     MILADY     [place] Medical Record Number:  [id number]  Place of Service where encounter took place:  No question data found.  Code Status:  DNR    HISTORY:      HPI:  Henrietta Mitchell  is 89 year old (11/30/1927) undergoing physical and occupational therapy. She is  with a history of osteoporosis and MGUS admitted 10/31/2022 following mechanical fall. She was diagnosed with a pelvic ring fracture and right humerus fracture. The humerus was splinted in the ED. Ortho saw her and felt both fractures were not amenable to surgical fixation.    She developed some urinary retention while on narcotics, and dysuria later on. UA was abnormal and she was started on Ciprofloxacin for acute cystitis.       Today she was seen to review VS, Labs, Routine visit.  She denied chest pain shortness of breath cough or congestion.  She is now moving her bowels.  She denies any numbness tingling 3+ pulse right upper extremity she completed Cipro on 11/ 8 due to cystitis.  Today she reports her pain is controlled    ALLERGIES:Amoxicillin; Cefuroxime; Cephalexin; Mannitol; Penicillin g; Water, sterile; Zoledronic acid; Penicillins; Sulfabenzamide; and Tetracycline    PAST MEDICAL HISTORY: No past medical history on file.    PAST SURGICAL HISTORY:   has no past surgical history on file.    FAMILY HISTORY: family history is not on file.    SOCIAL HISTORY:      ROS:  Constitutional: Negative for activity change, appetite change, fatigue and fever.  Nonweightbearing right upper extremity  HENT: Negative for congestion.    Respiratory: Negative for cough, shortness of breath and wheezing.    Cardiovascular: Negative for chest pain and leg swelling.   Gastrointestinal: Negative for abdominal distention, abdominal pain,  negative constipation, diarrhea and nausea.   Genitourinary: Negative for dysuria.   Musculoskeletal: Positive for arthralgia. Negative for back pain.    Skin: Negative for color change and wound.   Neurological: Negative for dizziness.   Psychiatric/Behavioral: Negative for agitation, behavioral problems and confusion.     Physical Exam:  Constitutional:       Appearance: Patient is well-developed.   HENT:      Head: Normocephalic.   Eyes:      Conjunctiva/sclera: Conjunctivae normal.   Neck:      Musculoskeletal: Normal range of motion.   Cardiovascular:      Rate and Rhythm: Normal rate and regular rhythm.      Heart sounds: Normal heart sounds. No murmur.   Pulmonary:      Effort: No respiratory distress.      Breath sounds: Normal breath sounds. No wheezing or rales.   Abdominal:      General: Bowel sounds are normal. There is no distension.      Palpations: Abdomen is soft.      Tenderness: There is no abdominal tenderness.   Musculoskeletal:       Normal range of motion.     Skin:General:        Skin is warm.   Neurological:         Mental Status: Patient is alert and oriented to person, place, and time.   Psychiatric:         Behavior: Behavior normal.     Vitals:/64   Pulse 67   Temp 96.8  F (36  C)   Resp 18   Wt 71.2 kg (157 lb)   SpO2 99%   BMI 26.13 kg/m   and Body mass index is 26.13 kg/m .    Lab/Diagnostic data:   Recent Results (from the past 240 hour(s))   Basic metabolic panel    Collection Time: 11/08/22  9:46 AM   Result Value Ref Range    Sodium 134 (L) 136 - 145 mmol/L    Potassium 4.7 3.4 - 5.3 mmol/L    Chloride 100 98 - 107 mmol/L    Carbon Dioxide (CO2) 17 (L) 22 - 29 mmol/L    Anion Gap 17 (H) 7 - 15 mmol/L    Urea Nitrogen 19.5 8.0 - 23.0 mg/dL    Creatinine 0.68 0.51 - 0.95 mg/dL    Calcium 8.8 8.2 - 9.6 mg/dL    Glucose 168 (H) 70 - 99 mg/dL    GFR Estimate 80 >60 mL/min/1.73m2   Magnesium    Collection Time: 11/08/22  9:46 AM   Result Value Ref Range    Magnesium 2.3 1.7 - 2.3 mg/dL   CBC with platelets and differential    Collection Time: 11/08/22  9:46 AM   Result Value Ref Range    WBC Count 6.7 4.0 - 11.0 10e3/uL     RBC Count 3.43 (L) 3.80 - 5.20 10e6/uL    Hemoglobin 10.9 (L) 11.7 - 15.7 g/dL    Hematocrit 34.5 (L) 35.0 - 47.0 %     (H) 78 - 100 fL    MCH 31.8 26.5 - 33.0 pg    MCHC 31.6 31.5 - 36.5 g/dL    RDW 13.3 10.0 - 15.0 %    Platelet Count 494 (H) 150 - 450 10e3/uL    % Neutrophils 70 %    % Lymphocytes 15 %    % Monocytes 11 %    % Eosinophils 2 %    % Basophils 1 %    % Immature Granulocytes 1 %    NRBCs per 100 WBC 0 <1 /100    Absolute Neutrophils 4.8 1.6 - 8.3 10e3/uL    Absolute Lymphocytes 1.0 0.8 - 5.3 10e3/uL    Absolute Monocytes 0.8 0.0 - 1.3 10e3/uL    Absolute Eosinophils 0.1 0.0 - 0.7 10e3/uL    Absolute Basophils 0.1 0.0 - 0.2 10e3/uL    Absolute Immature Granulocytes 0.0 <=0.4 10e3/uL    Absolute NRBCs 0.0 10e3/uL       MEDICATIONS:     Review of your medicines          Accurate as of November 16, 2022  8:21 AM. If you have any questions, ask your nurse or doctor.            CONTINUE these medicines which have NOT CHANGED      Dose / Directions   acetaminophen 500 MG tablet  Commonly known as: TYLENOL      Dose: 1,000 mg  Take 1,000 mg by mouth every 6 hours as needed  Refills: 0     aspirin 81 MG chewable tablet  Commonly known as: ASA      Dose: 81 mg  Take 81 mg by mouth 2 times daily  Refills: 0     bisacodyl 10 MG suppository  Commonly known as: DULCOLAX      Dose: 10 mg  Place 10 mg rectally daily as needed for constipation  Refills: 0     calcium carbonate 1500 (600 Ca) MG tablet  Commonly known as: OS-TENZIN      Dose: 1,200 mg  Take 1,200 mg by mouth daily  Refills: 0     cholecalciferol 125 mcg (5000 units) capsule  Commonly known as: VITAMIN D3      Take by mouth daily  Refills: 0     mirtazapine 15 MG tablet  Commonly known as: REMERON      Dose: 3.75 mg  Take 3.75 mg by mouth daily  Refills: 0     omeprazole 20 MG EC tablet  Commonly known as: priLOSEC OTC      Dose: 20 mg  Take 20 mg by mouth 2 times daily  Refills: 0     oxyCODONE 5 MG tablet  Commonly known as: ROXICODONE  Used for:  Pain management      Dose: 2.5 mg  Take 0.5 tablets (2.5 mg) by mouth every 4 hours as needed  Quantity: 42 tablet  Refills: 0     senna-docusate 8.6-50 MG tablet  Commonly known as: SENOKOT-S/PERICOLACE      Dose: 1 tablet  Take 1 tablet by mouth 2 times daily  Refills: 0            ASSESSMENT/PLAN  Encounter Diagnoses   Name Primary?     Physical deconditioning Yes     Closed fracture of multiple pubic rami, right, with routine healing, subsequent encounter      Right humerus fracture- non operative, Follow up with Orthopedics and pain management     Pain management Tylenol PRN, PRN Oxycodone    Anxiety- PRN Remeron       Physical deconditioning PT/OT    Cystitis-completed ciprofloxacin on 11/8/22    GERD- On Omeprazole      Electronically signed by: Cailin Delarosa CNP

## 2022-11-16 NOTE — LETTER
11/16/2022        RE: Henrietta Mitchell  [address] [place] [address]        [facility] GERIATRIC SERVICES  Chief Complaint   Patient presents with     RECHECK     [place] Medical Record Number:  [id number]  Place of Service where encounter took place:  No question data found.  Code Status:  DNR    HISTORY:      HPI:  Henrietta Mitchell  is 89 year old (11/30/1927) undergoing physical and occupational therapy. She is  with a history of osteoporosis and MGUS admitted 10/31/2022 following mechanical fall. She was diagnosed with a pelvic ring fracture and right humerus fracture. The humerus was splinted in the ED. Ortho saw her and felt both fractures were not amenable to surgical fixation.    She developed some urinary retention while on narcotics, and dysuria later on. UA was abnormal and she was started on Ciprofloxacin for acute cystitis.       Today she was seen to review VS, Labs, Routine visit.  She denied chest pain shortness of breath cough or congestion.  She is now moving her bowels.  She denies any numbness tingling 3+ pulse right upper extremity she completed Cipro on 11/ 8 due to cystitis.  Today she reports her pain is controlled    ALLERGIES:Amoxicillin; Cefuroxime; Cephalexin; Mannitol; Penicillin g; Water, sterile; Zoledronic acid; Penicillins; Sulfabenzamide; and Tetracycline    PAST MEDICAL HISTORY: No past medical history on file.    PAST SURGICAL HISTORY:   has no past surgical history on file.    FAMILY HISTORY: family history is not on file.    SOCIAL HISTORY:      ROS:  Constitutional: Negative for activity change, appetite change, fatigue and fever.  Nonweightbearing right upper extremity  HENT: Negative for congestion.    Respiratory: Negative for cough, shortness of breath and wheezing.    Cardiovascular: Negative for chest pain and leg swelling.   Gastrointestinal: Negative for abdominal distention, abdominal pain,  negative constipation, diarrhea and nausea.   Genitourinary:  Negative for dysuria.   Musculoskeletal: Positive for arthralgia. Negative for back pain.   Skin: Negative for color change and wound.   Neurological: Negative for dizziness.   Psychiatric/Behavioral: Negative for agitation, behavioral problems and confusion.     Physical Exam:  Constitutional:       Appearance: Patient is well-developed.   HENT:      Head: Normocephalic.   Eyes:      Conjunctiva/sclera: Conjunctivae normal.   Neck:      Musculoskeletal: Normal range of motion.   Cardiovascular:      Rate and Rhythm: Normal rate and regular rhythm.      Heart sounds: Normal heart sounds. No murmur.   Pulmonary:      Effort: No respiratory distress.      Breath sounds: Normal breath sounds. No wheezing or rales.   Abdominal:      General: Bowel sounds are normal. There is no distension.      Palpations: Abdomen is soft.      Tenderness: There is no abdominal tenderness.   Musculoskeletal:       Normal range of motion.     Skin:General:        Skin is warm.   Neurological:         Mental Status: Patient is alert and oriented to person, place, and time.   Psychiatric:         Behavior: Behavior normal.     Vitals:/64   Pulse 67   Temp 96.8  F (36  C)   Resp 18   Wt 71.2 kg (157 lb)   SpO2 99%   BMI 26.13 kg/m   and Body mass index is 26.13 kg/m .    Lab/Diagnostic data:   Recent Results (from the past 240 hour(s))   Basic metabolic panel    Collection Time: 11/08/22  9:46 AM   Result Value Ref Range    Sodium 134 (L) 136 - 145 mmol/L    Potassium 4.7 3.4 - 5.3 mmol/L    Chloride 100 98 - 107 mmol/L    Carbon Dioxide (CO2) 17 (L) 22 - 29 mmol/L    Anion Gap 17 (H) 7 - 15 mmol/L    Urea Nitrogen 19.5 8.0 - 23.0 mg/dL    Creatinine 0.68 0.51 - 0.95 mg/dL    Calcium 8.8 8.2 - 9.6 mg/dL    Glucose 168 (H) 70 - 99 mg/dL    GFR Estimate 80 >60 mL/min/1.73m2   Magnesium    Collection Time: 11/08/22  9:46 AM   Result Value Ref Range    Magnesium 2.3 1.7 - 2.3 mg/dL   CBC with platelets and differential    Collection  Time: 11/08/22  9:46 AM   Result Value Ref Range    WBC Count 6.7 4.0 - 11.0 10e3/uL    RBC Count 3.43 (L) 3.80 - 5.20 10e6/uL    Hemoglobin 10.9 (L) 11.7 - 15.7 g/dL    Hematocrit 34.5 (L) 35.0 - 47.0 %     (H) 78 - 100 fL    MCH 31.8 26.5 - 33.0 pg    MCHC 31.6 31.5 - 36.5 g/dL    RDW 13.3 10.0 - 15.0 %    Platelet Count 494 (H) 150 - 450 10e3/uL    % Neutrophils 70 %    % Lymphocytes 15 %    % Monocytes 11 %    % Eosinophils 2 %    % Basophils 1 %    % Immature Granulocytes 1 %    NRBCs per 100 WBC 0 <1 /100    Absolute Neutrophils 4.8 1.6 - 8.3 10e3/uL    Absolute Lymphocytes 1.0 0.8 - 5.3 10e3/uL    Absolute Monocytes 0.8 0.0 - 1.3 10e3/uL    Absolute Eosinophils 0.1 0.0 - 0.7 10e3/uL    Absolute Basophils 0.1 0.0 - 0.2 10e3/uL    Absolute Immature Granulocytes 0.0 <=0.4 10e3/uL    Absolute NRBCs 0.0 10e3/uL       MEDICATIONS:     Review of your medicines          Accurate as of November 16, 2022  8:21 AM. If you have any questions, ask your nurse or doctor.            CONTINUE these medicines which have NOT CHANGED      Dose / Directions   acetaminophen 500 MG tablet  Commonly known as: TYLENOL      Dose: 1,000 mg  Take 1,000 mg by mouth every 6 hours as needed  Refills: 0     aspirin 81 MG chewable tablet  Commonly known as: ASA      Dose: 81 mg  Take 81 mg by mouth 2 times daily  Refills: 0     bisacodyl 10 MG suppository  Commonly known as: DULCOLAX      Dose: 10 mg  Place 10 mg rectally daily as needed for constipation  Refills: 0     calcium carbonate 1500 (600 Ca) MG tablet  Commonly known as: OS-TENZIN      Dose: 1,200 mg  Take 1,200 mg by mouth daily  Refills: 0     cholecalciferol 125 mcg (5000 units) capsule  Commonly known as: VITAMIN D3      Take by mouth daily  Refills: 0     mirtazapine 15 MG tablet  Commonly known as: REMERON      Dose: 3.75 mg  Take 3.75 mg by mouth daily  Refills: 0     omeprazole 20 MG EC tablet  Commonly known as: priLOSEC OTC      Dose: 20 mg  Take 20 mg by mouth 2  times daily  Refills: 0     oxyCODONE 5 MG tablet  Commonly known as: ROXICODONE  Used for: Pain management      Dose: 2.5 mg  Take 0.5 tablets (2.5 mg) by mouth every 4 hours as needed  Quantity: 42 tablet  Refills: 0     senna-docusate 8.6-50 MG tablet  Commonly known as: SENOKOT-S/PERICOLACE      Dose: 1 tablet  Take 1 tablet by mouth 2 times daily  Refills: 0            ASSESSMENT/PLAN  Encounter Diagnoses   Name Primary?     Physical deconditioning Yes     Closed fracture of multiple pubic rami, right, with routine healing, subsequent encounter      Right humerus fracture- non operative, Follow up with Orthopedics and pain management     Pain management Tylenol PRN, PRN Oxycodone    Anxiety- PRN Remeron       Physical deconditioning PT/OT    Cystitis-completed ciprofloxacin on 11/8/22    GERD- On Omeprazole      Electronically signed by: Cailin Delarosa CNP        Sincerely,        Cailin Delarosa CNP

## 2022-11-25 ENCOUNTER — TRANSITIONAL CARE UNIT VISIT (OUTPATIENT)
Dept: GERIATRICS | Facility: CLINIC | Age: 87
End: 2022-11-25
Payer: COMMERCIAL

## 2022-11-25 VITALS
WEIGHT: 157 LBS | OXYGEN SATURATION: 96 % | TEMPERATURE: 96.6 F | BODY MASS INDEX: 26.16 KG/M2 | HEART RATE: 70 BPM | SYSTOLIC BLOOD PRESSURE: 97 MMHG | DIASTOLIC BLOOD PRESSURE: 52 MMHG | RESPIRATION RATE: 18 BRPM | HEIGHT: 65 IN

## 2022-11-25 DIAGNOSIS — R52 PAIN MANAGEMENT: ICD-10-CM

## 2022-11-25 DIAGNOSIS — F41.9 ANXIETY: ICD-10-CM

## 2022-11-25 DIAGNOSIS — R53.81 PHYSICAL DECONDITIONING: Primary | ICD-10-CM

## 2022-11-25 DIAGNOSIS — S32.591D CLOSED FRACTURE OF MULTIPLE PUBIC RAMI, RIGHT, WITH ROUTINE HEALING, SUBSEQUENT ENCOUNTER: ICD-10-CM

## 2022-11-25 PROCEDURE — 99309 SBSQ NF CARE MODERATE MDM 30: CPT | Performed by: NURSE PRACTITIONER

## 2022-11-25 NOTE — PROGRESS NOTES
[facility] GERIATRIC SERVICES  Chief Complaint   Patient presents with     MILADY     [place] Medical Record Number:  [id number]  Place of Service where encounter took place:  [facility] ([facility]) [[id number]]  Code Status:  DNR    HISTORY:      HPI:  Henrietta Mitchell  is 89 year old (11/30/1927) undergoing physical and occupational therapy. She is  with a history of osteoporosis and MGUS admitted 10/31/2022 following mechanical fall. She was diagnosed with a pelvic ring fracture and right humerus fracture. The humerus was splinted in the ED. Ortho saw her and felt both fractures were not amenable to surgical fixation.    She developed some urinary retention while on narcotics, and dysuria later on. UA was abnormal and she was started on Ciprofloxacin for acute cystitis.       Today she was seen to review VS, Labs, Routine visit.  She denied chest pain shortness of breath cough or congestion.  Patient had diarrhea she denies any numbness tingling 3+ pulse right upper extremity she completed Cipro on 11/ 8 due to cystitis.  Her pain is controlled.  Her main report is that she is not sleeping at night and reports that the Remeron helps with her anxiety and sleep.  Medication was changed to scheduled at bedtime    ALLERGIES:Amoxicillin; Cefuroxime; Cephalexin; Mannitol; Penicillin g; Water, sterile; Zoledronic acid; Penicillins; Sulfabenzamide; and Tetracycline    PAST MEDICAL HISTORY: History reviewed. No pertinent past medical history.    PAST SURGICAL HISTORY:   has no past surgical history on file.    FAMILY HISTORY: family history is not on file.    SOCIAL HISTORY:      ROS:  Constitutional: Negative for activity change, appetite change, fatigue and fever.  Nonweightbearing right upper extremity  HENT: Negative for congestion.    Respiratory: Negative for cough, shortness of breath and wheezing.    Cardiovascular: Negative for chest pain and leg swelling.   Gastrointestinal: Negative for abdominal distention,  "abdominal pain,  negative constipation, diarrhea and nausea.   Genitourinary: Negative for dysuria.   Musculoskeletal: Positive for arthralgia. Negative for back pain.   Skin: Negative for color change and wound.   Neurological: Negative for dizziness.   Psychiatric/Behavioral: Negative for agitation, behavioral problems and confusion.  Insomnia Remeron changed to scheduled at at bedtime    Physical Exam:  Constitutional:       Appearance: Patient is well-developed.   HENT:      Head: Normocephalic.   Eyes:      Conjunctiva/sclera: Conjunctivae normal.   Neck:      Musculoskeletal: Normal range of motion.   Cardiovascular:      Rate and Rhythm: Normal rate and regular rhythm.      Heart sounds: Normal heart sounds. No murmur.   Pulmonary:      Effort: No respiratory distress.      Breath sounds: Normal breath sounds. No wheezing or rales.   Abdominal:      General: Bowel sounds are normal. There is no distension.      Palpations: Abdomen is soft.      Tenderness: There is no abdominal tenderness.   Musculoskeletal:       Normal range of motion.     Skin:General:        Skin is warm.   Neurological:         Mental Status: Patient is alert and oriented to person, place, and time.   Psychiatric:         Behavior: Behavior normal.     Vitals:BP 97/52   Pulse 70   Temp (!) 96.6  F (35.9  C)   Resp 18   Ht 1.651 m (5' 5\")   Wt 71.2 kg (157 lb)   SpO2 96%   BMI 26.13 kg/m   and Body mass index is 26.13 kg/m .    Lab/Diagnostic data:   No results found for this or any previous visit (from the past 240 hour(s)).    MEDICATIONS:     Review of your medicines          Accurate as of November 25, 2022  9:24 AM. If you have any questions, ask your nurse or doctor.            CONTINUE these medicines which have NOT CHANGED      Dose / Directions   acetaminophen 500 MG tablet  Commonly known as: TYLENOL      Dose: 1,000 mg  Take 1,000 mg by mouth every 6 hours as needed  Refills: 0     aspirin 81 MG chewable tablet  Commonly " known as: ASA      Dose: 81 mg  Take 81 mg by mouth 2 times daily  Refills: 0     bisacodyl 10 MG suppository  Commonly known as: DULCOLAX      Dose: 10 mg  Place 10 mg rectally daily as needed for constipation  Refills: 0     calcium carbonate 1500 (600 Ca) MG tablet  Commonly known as: OS-TENZIN      Dose: 1,200 mg  Take 1,200 mg by mouth daily  Refills: 0     cholecalciferol 125 mcg (5000 units) capsule  Commonly known as: VITAMIN D3      Take by mouth daily  Refills: 0     mirtazapine 15 MG tablet  Commonly known as: REMERON      Dose: 3.75 mg  Take 3.75 mg by mouth At Bedtime  Refills: 0     omeprazole 20 MG EC tablet  Commonly known as: priLOSEC OTC      Dose: 20 mg  Take 20 mg by mouth 2 times daily  Refills: 0     oxyCODONE 5 MG tablet  Commonly known as: ROXICODONE  Used for: Pain management      Dose: 2.5 mg  Take 0.5 tablets (2.5 mg) by mouth every 4 hours as needed  Quantity: 42 tablet  Refills: 0     senna-docusate 8.6-50 MG tablet  Commonly known as: SENOKOT-S/PERICOLACE      Dose: 1 tablet  Take 1 tablet by mouth 2 times daily  Refills: 0            ASSESSMENT/PLAN  Encounter Diagnoses   Name Primary?     Physical deconditioning Yes     Closed fracture of multiple pubic rami, right, with routine healing, subsequent encounter      Pain management      Anxiety      Right humerus fracture- non operative, Follow up with Orthopedics and pain management     Pain management Tylenol PRN, PRN Oxycodone    Anxiety- Change Remeron to scheduled at HS    Physical deconditioning PT/OT    Cystitis-completed ciprofloxacin on 11/8/22    GERD- On Omeprazole      Electronically signed by: Cailin Delarosa CNP

## 2022-11-25 NOTE — LETTER
11/25/2022        RE: Henrietta Mitchell  [address] [place] [address]        [facility] GERIATRIC SERVICES  Chief Complaint   Patient presents with     [facility] Medical Record Number:  [id number]  Place of Service where encounter took place:  [facility] ([facility]) [[id number]]  Code Status:  DNR    HISTORY:      HPI:  Henrietta Mitchell  is 89 year old (11/30/1927) undergoing physical and occupational therapy. She is  with a history of osteoporosis and MGUS admitted 10/31/2022 following mechanical fall. She was diagnosed with a pelvic ring fracture and right humerus fracture. The humerus was splinted in the ED. Ortho saw her and felt both fractures were not amenable to surgical fixation.    She developed some urinary retention while on narcotics, and dysuria later on. UA was abnormal and she was started on Ciprofloxacin for acute cystitis.       Today she was seen to review VS, Labs, Routine visit.  She denied chest pain shortness of breath cough or congestion.  Patient had diarrhea she denies any numbness tingling 3+ pulse right upper extremity she completed Cipro on 11/ 8 due to cystitis.  Her pain is controlled.  Her main report is that she is not sleeping at night and reports that the Remeron helps with her anxiety and sleep.  Medication was changed to scheduled at bedtime    ALLERGIES:Amoxicillin; Cefuroxime; Cephalexin; Mannitol; Penicillin g; Water, sterile; Zoledronic acid; Penicillins; Sulfabenzamide; and Tetracycline    PAST MEDICAL HISTORY: History reviewed. No pertinent past medical history.    PAST SURGICAL HISTORY:   has no past surgical history on file.    FAMILY HISTORY: family history is not on file.    SOCIAL HISTORY:      ROS:  Constitutional: Negative for activity change, appetite change, fatigue and fever.  Nonweightbearing right upper extremity  HENT: Negative for congestion.    Respiratory: Negative for cough, shortness of breath and wheezing.    Cardiovascular:  "Negative for chest pain and leg swelling.   Gastrointestinal: Negative for abdominal distention, abdominal pain,  negative constipation, diarrhea and nausea.   Genitourinary: Negative for dysuria.   Musculoskeletal: Positive for arthralgia. Negative for back pain.   Skin: Negative for color change and wound.   Neurological: Negative for dizziness.   Psychiatric/Behavioral: Negative for agitation, behavioral problems and confusion.  Insomnia Remeron changed to scheduled at at bedtime    Physical Exam:  Constitutional:       Appearance: Patient is well-developed.   HENT:      Head: Normocephalic.   Eyes:      Conjunctiva/sclera: Conjunctivae normal.   Neck:      Musculoskeletal: Normal range of motion.   Cardiovascular:      Rate and Rhythm: Normal rate and regular rhythm.      Heart sounds: Normal heart sounds. No murmur.   Pulmonary:      Effort: No respiratory distress.      Breath sounds: Normal breath sounds. No wheezing or rales.   Abdominal:      General: Bowel sounds are normal. There is no distension.      Palpations: Abdomen is soft.      Tenderness: There is no abdominal tenderness.   Musculoskeletal:       Normal range of motion.     Skin:General:        Skin is warm.   Neurological:         Mental Status: Patient is alert and oriented to person, place, and time.   Psychiatric:         Behavior: Behavior normal.     Vitals:BP 97/52   Pulse 70   Temp (!) 96.6  F (35.9  C)   Resp 18   Ht 1.651 m (5' 5\")   Wt 71.2 kg (157 lb)   SpO2 96%   BMI 26.13 kg/m   and Body mass index is 26.13 kg/m .    Lab/Diagnostic data:   No results found for this or any previous visit (from the past 240 hour(s)).    MEDICATIONS:     Review of your medicines          Accurate as of November 25, 2022  9:24 AM. If you have any questions, ask your nurse or doctor.            CONTINUE these medicines which have NOT CHANGED      Dose / Directions   acetaminophen 500 MG tablet  Commonly known as: TYLENOL      Dose: 1,000 mg  Take " 1,000 mg by mouth every 6 hours as needed  Refills: 0     aspirin 81 MG chewable tablet  Commonly known as: ASA      Dose: 81 mg  Take 81 mg by mouth 2 times daily  Refills: 0     bisacodyl 10 MG suppository  Commonly known as: DULCOLAX      Dose: 10 mg  Place 10 mg rectally daily as needed for constipation  Refills: 0     calcium carbonate 1500 (600 Ca) MG tablet  Commonly known as: OS-TENZIN      Dose: 1,200 mg  Take 1,200 mg by mouth daily  Refills: 0     cholecalciferol 125 mcg (5000 units) capsule  Commonly known as: VITAMIN D3      Take by mouth daily  Refills: 0     mirtazapine 15 MG tablet  Commonly known as: REMERON      Dose: 3.75 mg  Take 3.75 mg by mouth At Bedtime  Refills: 0     omeprazole 20 MG EC tablet  Commonly known as: priLOSEC OTC      Dose: 20 mg  Take 20 mg by mouth 2 times daily  Refills: 0     oxyCODONE 5 MG tablet  Commonly known as: ROXICODONE  Used for: Pain management      Dose: 2.5 mg  Take 0.5 tablets (2.5 mg) by mouth every 4 hours as needed  Quantity: 42 tablet  Refills: 0     senna-docusate 8.6-50 MG tablet  Commonly known as: SENOKOT-S/PERICOLACE      Dose: 1 tablet  Take 1 tablet by mouth 2 times daily  Refills: 0            ASSESSMENT/PLAN  Encounter Diagnoses   Name Primary?     Physical deconditioning Yes     Closed fracture of multiple pubic rami, right, with routine healing, subsequent encounter      Pain management      Anxiety      Right humerus fracture- non operative, Follow up with Orthopedics and pain management     Pain management Tylenol PRN, PRN Oxycodone    Anxiety- Change Remeron to scheduled at HS    Physical deconditioning PT/OT    Cystitis-completed ciprofloxacin on 11/8/22    GERD- On Omeprazole      Electronically signed by: Cailin Delarosa CNP        Sincerely,        Cailin Delarosa CNP

## 2022-11-28 ENCOUNTER — TRANSITIONAL CARE UNIT VISIT (OUTPATIENT)
Dept: GERIATRICS | Facility: CLINIC | Age: 87
End: 2022-11-28
Payer: COMMERCIAL

## 2022-11-28 VITALS
BODY MASS INDEX: 26.16 KG/M2 | WEIGHT: 157 LBS | DIASTOLIC BLOOD PRESSURE: 55 MMHG | RESPIRATION RATE: 16 BRPM | HEIGHT: 65 IN | HEART RATE: 65 BPM | TEMPERATURE: 97.9 F | OXYGEN SATURATION: 97 % | SYSTOLIC BLOOD PRESSURE: 120 MMHG

## 2022-11-28 DIAGNOSIS — R52 PAIN MANAGEMENT: ICD-10-CM

## 2022-11-28 DIAGNOSIS — F41.9 ANXIETY: ICD-10-CM

## 2022-11-28 DIAGNOSIS — R53.81 PHYSICAL DECONDITIONING: Primary | ICD-10-CM

## 2022-11-28 DIAGNOSIS — S32.591D CLOSED FRACTURE OF MULTIPLE PUBIC RAMI, RIGHT, WITH ROUTINE HEALING, SUBSEQUENT ENCOUNTER: ICD-10-CM

## 2022-11-28 PROCEDURE — 99309 SBSQ NF CARE MODERATE MDM 30: CPT | Performed by: NURSE PRACTITIONER

## 2022-11-28 NOTE — LETTER
11/28/2022        RE: Henrietta Mitchell  [address] [place] [address]        [facility] GERIATRIC SERVICES  Chief Complaint   Patient presents with     [facility] Medical Record Number:  [id number]  Place of Service where encounter took place:  [facility] ([facility]) [[id number]]  Code Status:  DNR    HISTORY:      HPI:  Henrietta Mitchell  is 89 year old (11/30/1927) undergoing physical and occupational therapy. She is  with a history of osteoporosis and MGUS admitted 10/31/2022 following mechanical fall. She was diagnosed with a pelvic ring fracture and right humerus fracture. The humerus was splinted in the ED. Ortho saw her and felt both fractures were not amenable to surgical fixation.    She developed some urinary retention while on narcotics, and dysuria later on. UA was abnormal and she was started on Ciprofloxacin for acute cystitis.       Today she was seen to review VS, Labs, Routine visit.  She denied chest pain shortness of breath cough or congestion.  She denies any numbness tingling 3+ pulse right upper extremity she completed Cipro on 11/ 8 due to cystitis.  Her pain is controlled.  She reports that her scheduled Remeron has been working very well to help her sleep at night  She denies any constipation or diarrhea.    ALLERGIES:Amoxicillin; Cefuroxime; Cephalexin; Mannitol; Penicillin g; Water, sterile; Zoledronic acid; Penicillins; Sulfabenzamide; and Tetracycline    PAST MEDICAL HISTORY: No past medical history on file.    PAST SURGICAL HISTORY:   has no past surgical history on file.    FAMILY HISTORY: family history is not on file.    SOCIAL HISTORY:      ROS:  Constitutional: Negative for activity change, appetite change, fatigue and fever.  Nonweightbearing right upper extremity  HENT: Negative for congestion.    Respiratory: Negative for cough, shortness of breath and wheezing.    Cardiovascular: Negative for chest pain and leg swelling.   Gastrointestinal: Negative for  "abdominal distention, abdominal pain,  negative constipation, diarrhea and nausea.   Genitourinary: Negative for dysuria.   Musculoskeletal: Positive for arthralgia. Negative for back pain.   Skin: Negative for color change and wound.   Neurological: Negative for dizziness.   Psychiatric/Behavioral: Negative for agitation, behavioral problems and confusion.  Insomnia on Remeron with relief   Physical Exam:  Constitutional:       Appearance: Patient is well-developed.   HENT:      Head: Normocephalic.   Eyes:      Conjunctiva/sclera: Conjunctivae normal.   Neck:      Musculoskeletal: Normal range of motion.   Cardiovascular:      Rate and Rhythm: Normal rate and regular rhythm.      Heart sounds: Normal heart sounds. No murmur.   Pulmonary:      Effort: No respiratory distress.      Breath sounds: Normal breath sounds. No wheezing or rales.   Abdominal:      General: Bowel sounds are normal. There is no distension.      Palpations: Abdomen is soft.      Tenderness: There is no abdominal tenderness.   Musculoskeletal:       Normal range of motion.     Skin:General:        Skin is warm.   Neurological:         Mental Status: Patient is alert and oriented to person, place, and time.   Psychiatric:         Behavior: Behavior normal.     Vitals:/55   Pulse 65   Temp 97.9  F (36.6  C)   Resp 16   Ht 1.651 m (5' 5\")   Wt 71.2 kg (157 lb)   SpO2 97%   BMI 26.13 kg/m   and Body mass index is 26.13 kg/m .    Lab/Diagnostic data:   No results found for this or any previous visit (from the past 240 hour(s)).    MEDICATIONS:     Review of your medicines          Accurate as of November 28, 2022 12:32 PM. If you have any questions, ask your nurse or doctor.            CONTINUE these medicines which have NOT CHANGED      Dose / Directions   acetaminophen 500 MG tablet  Commonly known as: TYLENOL      Dose: 1,000 mg  Take 1,000 mg by mouth every 6 hours as needed  Refills: 0     aspirin 81 MG chewable tablet  Commonly " known as: ASA      Dose: 81 mg  Take 81 mg by mouth 2 times daily  Refills: 0     bisacodyl 10 MG suppository  Commonly known as: DULCOLAX      Dose: 10 mg  Place 10 mg rectally daily as needed for constipation  Refills: 0     calcium carbonate 1500 (600 Ca) MG tablet  Commonly known as: OS-TENZIN      Dose: 1,200 mg  Take 1,200 mg by mouth daily  Refills: 0     cholecalciferol 125 mcg (5000 units) capsule  Commonly known as: VITAMIN D3      Take by mouth daily  Refills: 0     mirtazapine 15 MG tablet  Commonly known as: REMERON      Dose: 3.75 mg  Take 3.75 mg by mouth At Bedtime  Refills: 0     omeprazole 20 MG EC tablet  Commonly known as: priLOSEC OTC      Dose: 20 mg  Take 20 mg by mouth 2 times daily  Refills: 0     oxyCODONE 5 MG tablet  Commonly known as: ROXICODONE  Used for: Pain management      Dose: 2.5 mg  Take 0.5 tablets (2.5 mg) by mouth every 4 hours as needed  Quantity: 42 tablet  Refills: 0     senna-docusate 8.6-50 MG tablet  Commonly known as: SENOKOT-S/PERICOLACE      Dose: 1 tablet  Take 1 tablet by mouth 2 times daily  Refills: 0            ASSESSMENT/PLAN  Encounter Diagnoses   Name Primary?     Physical deconditioning Yes     Pain management      Closed fracture of multiple pubic rami, right, with routine healing, subsequent encounter      Anxiety      Right humerus fracture- non operative, Follow up with Orthopedics and pain management     Pain management Tylenol PRN, PRN Oxycodone    Insomnia/anxiety-  Remeron to scheduled at HS    Physical deconditioning PT/OT    GERD- On Omeprazole      Electronically signed by: Cailin Delarosa CNP        Sincerely,        Cailin Delarosa CNP

## 2022-11-28 NOTE — PROGRESS NOTES
[facility] GERIATRIC SERVICES  Chief Complaint   Patient presents with     MILADY     [facility] Medical Record Number:  [id number]  Place of Service where encounter took place:  [facility] ([facility]) [[id number]]  Code Status:  DNR    HISTORY:      HPI:  Henrietta Mitchell  is 89 year old (11/30/1927) undergoing physical and occupational therapy. She is  with a history of osteoporosis and MGUS admitted 10/31/2022 following mechanical fall. She was diagnosed with a pelvic ring fracture and right humerus fracture. The humerus was splinted in the ED. Ortho saw her and felt both fractures were not amenable to surgical fixation.    She developed some urinary retention while on narcotics, and dysuria later on. UA was abnormal and she was started on Ciprofloxacin for acute cystitis.       Today she was seen to review VS, Labs, Routine visit.  She denied chest pain shortness of breath cough or congestion.  She denies any numbness tingling 3+ pulse right upper extremity she completed Cipro on 11/ 8 due to cystitis.  Her pain is controlled.  She reports that her scheduled Remeron has been working very well to help her sleep at night  She denies any constipation or diarrhea.    ALLERGIES:Amoxicillin; Cefuroxime; Cephalexin; Mannitol; Penicillin g; Water, sterile; Zoledronic acid; Penicillins; Sulfabenzamide; and Tetracycline    PAST MEDICAL HISTORY: No past medical history on file.    PAST SURGICAL HISTORY:   has no past surgical history on file.    FAMILY HISTORY: family history is not on file.    SOCIAL HISTORY:      ROS:  Constitutional: Negative for activity change, appetite change, fatigue and fever.  Nonweightbearing right upper extremity  HENT: Negative for congestion.    Respiratory: Negative for cough, shortness of breath and wheezing.    Cardiovascular: Negative for chest pain and leg swelling.   Gastrointestinal: Negative for abdominal distention, abdominal pain,  negative constipation, diarrhea and nausea.  "  Genitourinary: Negative for dysuria.   Musculoskeletal: Positive for arthralgia. Negative for back pain.   Skin: Negative for color change and wound.   Neurological: Negative for dizziness.   Psychiatric/Behavioral: Negative for agitation, behavioral problems and confusion.  Insomnia on Remeron with relief   Physical Exam:  Constitutional:       Appearance: Patient is well-developed.   HENT:      Head: Normocephalic.   Eyes:      Conjunctiva/sclera: Conjunctivae normal.   Neck:      Musculoskeletal: Normal range of motion.   Cardiovascular:      Rate and Rhythm: Normal rate and regular rhythm.      Heart sounds: Normal heart sounds. No murmur.   Pulmonary:      Effort: No respiratory distress.      Breath sounds: Normal breath sounds. No wheezing or rales.   Abdominal:      General: Bowel sounds are normal. There is no distension.      Palpations: Abdomen is soft.      Tenderness: There is no abdominal tenderness.   Musculoskeletal:       Normal range of motion.     Skin:General:        Skin is warm.   Neurological:         Mental Status: Patient is alert and oriented to person, place, and time.   Psychiatric:         Behavior: Behavior normal.     Vitals:/55   Pulse 65   Temp 97.9  F (36.6  C)   Resp 16   Ht 1.651 m (5' 5\")   Wt 71.2 kg (157 lb)   SpO2 97%   BMI 26.13 kg/m   and Body mass index is 26.13 kg/m .    Lab/Diagnostic data:   No results found for this or any previous visit (from the past 240 hour(s)).    MEDICATIONS:     Review of your medicines          Accurate as of November 28, 2022 12:32 PM. If you have any questions, ask your nurse or doctor.            CONTINUE these medicines which have NOT CHANGED      Dose / Directions   acetaminophen 500 MG tablet  Commonly known as: TYLENOL      Dose: 1,000 mg  Take 1,000 mg by mouth every 6 hours as needed  Refills: 0     aspirin 81 MG chewable tablet  Commonly known as: ASA      Dose: 81 mg  Take 81 mg by mouth 2 times daily  Refills: 0   "   bisacodyl 10 MG suppository  Commonly known as: DULCOLAX      Dose: 10 mg  Place 10 mg rectally daily as needed for constipation  Refills: 0     calcium carbonate 1500 (600 Ca) MG tablet  Commonly known as: OS-TENZIN      Dose: 1,200 mg  Take 1,200 mg by mouth daily  Refills: 0     cholecalciferol 125 mcg (5000 units) capsule  Commonly known as: VITAMIN D3      Take by mouth daily  Refills: 0     mirtazapine 15 MG tablet  Commonly known as: REMERON      Dose: 3.75 mg  Take 3.75 mg by mouth At Bedtime  Refills: 0     omeprazole 20 MG EC tablet  Commonly known as: priLOSEC OTC      Dose: 20 mg  Take 20 mg by mouth 2 times daily  Refills: 0     oxyCODONE 5 MG tablet  Commonly known as: ROXICODONE  Used for: Pain management      Dose: 2.5 mg  Take 0.5 tablets (2.5 mg) by mouth every 4 hours as needed  Quantity: 42 tablet  Refills: 0     senna-docusate 8.6-50 MG tablet  Commonly known as: SENOKOT-S/PERICOLACE      Dose: 1 tablet  Take 1 tablet by mouth 2 times daily  Refills: 0            ASSESSMENT/PLAN  Encounter Diagnoses   Name Primary?     Physical deconditioning Yes     Pain management      Closed fracture of multiple pubic rami, right, with routine healing, subsequent encounter      Anxiety      Right humerus fracture- non operative, Follow up with Orthopedics and pain management     Pain management Tylenol PRN, PRN Oxycodone    Insomnia/anxiety-  Remeron to scheduled at HS    Physical deconditioning PT/OT    GERD- On Omeprazole      Electronically signed by: Cailin Delarosa CNP

## 2022-11-28 NOTE — PROGRESS NOTES
[facility] GERIATRIC SERVICES       Patient Henrietta Mitchell  MRN: [id number]        Reason for Visit     Chief Complaint   Patient presents with     RECHECK     INITIAL       Code Status     DNR / DNI    Assessment     History of a mechanical fall  Closed displaced fracture of the surgical neck of the right humerus  Close displaced fracture of the superior and inferior pubic rami on the right  Osteoporoses  MGUS  GERD  Generalized weakness    Plan     Pt is admitted to TCU for strengthening and rehab.  Presented with multiple fractures.  Right humeral surgical neck fracture to be treated conservatively.  Nonweightbearing on right upper extremity.  Right superior and inferior pubic rami fracture also being treated conservatively no surgical treatment.  Continue weightbearing as tolerated.  Pain control using Tylenol and narcotics.  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  She had a right humeral surgical neck fracture.  Orthopedics evaluated patient and did not recommend surgical intervention  Also noted to have fracture of the inferior and superior pubic rami on the right side of her pelvis.  Again given conservative treatment.  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  remeron scheduled  Due to weakness discharged to the TCU has underlying history of MGUS and will continue her treatments with her oncologist if desired    Past Medical &  "Surgical History       Hyperlipidemia E78.5     Osteoporosis, alendronate added 5/10 M81.0     Diverticulosis of Colon (without Mention of Hemorrhage), colonoscopy  K57.30     Primary osteoarthritis of both knees M17.0     Overactive bladder N32.81     Digital mucous cyst of the left thumb, distal phalanx M71.30     Severe back pain M54.9     RBBB I45.10       Past Social History     Reviewed,     Tobacco Use     Smoking status: Never Smoker     Smokeless tobacco: Never Used   Vaping Use     Vaping Use: Never used   Substance Use Topics     Alcohol use: Yes   Alcohol/week: 1.0 standard drink   Types: 1 Glasses of wine per week   Comment: socially     Drug use: No       Family History     Reviewed, and mother had hip fracture and  from pneumonia complications    Medication List   Post Discharge Medication Reconciliation Status: Post Discharge Medication Reconciliation Status: discharge medications reconciled and changed, per note/orders.  Current Outpatient Medications   Medication     acetaminophen (TYLENOL) 500 MG tablet     aspirin (ASA) 81 MG chewable tablet     bisacodyl (DULCOLAX) 10 MG suppository     calcium carbonate (OS-TENZIN) 1500 (600 Ca) MG tablet     cholecalciferol (VITAMIN D3) 125 mcg (5000 units) capsule     mirtazapine (REMERON) 15 MG tablet     omeprazole (PRILOSEC OTC) 20 MG EC tablet     oxyCODONE (ROXICODONE) 5 MG tablet     senna-docusate (SENOKOT-S/PERICOLACE) 8.6-50 MG tablet     No current facility-administered medications for this visit.          Allergies     Allergies   Allergen Reactions     Amoxicillin      Cefuroxime      Other reaction(s): *Unknown  Pt does not remember, but says \"it made me sick\" in 2018 and 2019     Cephalexin      Other reaction(s): Stomach Upset     Mannitol      Penicillin G      Water, Sterile      Zoledronic Acid Other (See Comments)     Bladder Problems      Penicillins Hives and Rash     Sulfabenzamide Rash     Tetracycline Rash       Review of Systems " "  A comprehensive review of 14 systems was done. Pertinent findings noted here and in history of present illness. All the rest negative.  Constitutional: Negative.  Negative for fever, chills, she has  activity change, appetite change and fatigue.   HENT: Negative for congestion and facial swelling.    Eyes: Negative for photophobia, redness and visual disturbance.   Respiratory: Negative for cough and chest tightness.    Cardiovascular: Negative for chest pain, palpitations and leg swelling.   Gastrointestinal: Negative for nausea, diarrhea, reports ongoing constipation,no  blood in stool and abdominal distention.   Genitourinary: Negative.    Musculoskeletal: Having difficulty maintaining her nonweightbearing status on right upper extremity.  Using a hemiwalker for ambulation.  Pain in the hip is more related to movement  Skin: Negative.    Neurological: Negative for dizziness, tremors, syncope, weakness, light-headedness and headaches.   Hematological: Does not bruise/bleed easily.   Psychiatric/Behavioral: Negative.  Has some insomnia concerns      Physical Exam   /48   Pulse 63   Temp 97.5  F (36.4  C)   Resp 18   Ht 1.651 m (5' 5\")   Wt 65.3 kg (144 lb)   SpO2 95%   BMI 23.96 kg/m       Constitutional: Oriented to person, place, and time and appears well-developed.   HEENT:  Normocephalic and atraumatic.  Eyes: Conjunctivae and EOM are normal. Pupils are equal, round, and reactive to light. No discharge.  No scleral icterus. Nose normal. Mouth/Throat: Oropharynx is clear and moist. No oropharyngeal exudate.    NECK: Normal range of motion. Neck supple. No JVD present. No tracheal deviation present. No thyromegaly present.   CARDIOVASCULAR: Normal rate, regular rhythm and intact distal pulses.  Exam reveals no gallop and no friction rub.  Systolic murmur present.  PULMONARY: Effort normal and breath sounds normal. No respiratory distress.No Wheezing or rales.  ABDOMEN: Soft. Bowel sounds are " normal. No distension and no mass.  There is no tenderness. There is no rebound and no guarding. No HSM.  MUSCULOSKELETAL: Normal range of motion. No edema and no tenderness. Mild kyphosis, no tenderness.  Right upper extremity is in a sling.  Right hip is tender  LYMPH NODES: Has no cervical, supraclavicular, axillary and groin adenopathy.   NEUROLOGICAL: Alert and oriented to person, place, and time. No cranial nerve deficit.  Normal muscle tone. Coordination normal.   GENITOURINARY: Deferred exam.  SKIN: Skin is warm and dry. No rash noted. No erythema. No pallor.   EXTREMITIES: No cyanosis, no clubbing, no edema. No Deformity.  PSYCHIATRIC: Normal mood, affect and behavior.      Lab Results     Last Comprehensive Metabolic Panel:  Sodium   Date Value Ref Range Status   11/08/2022 134 (L) 136 - 145 mmol/L Final     Potassium   Date Value Ref Range Status   11/08/2022 4.7 3.4 - 5.3 mmol/L Final     Chloride   Date Value Ref Range Status   11/08/2022 100 98 - 107 mmol/L Final     Carbon Dioxide (CO2)   Date Value Ref Range Status   11/08/2022 17 (L) 22 - 29 mmol/L Final     Anion Gap   Date Value Ref Range Status   11/08/2022 17 (H) 7 - 15 mmol/L Final     Glucose   Date Value Ref Range Status   11/08/2022 168 (H) 70 - 99 mg/dL Final     Urea Nitrogen   Date Value Ref Range Status   11/08/2022 19.5 8.0 - 23.0 mg/dL Final     Creatinine   Date Value Ref Range Status   11/08/2022 0.68 0.51 - 0.95 mg/dL Final     GFR Estimate   Date Value Ref Range Status   11/08/2022 80 >60 mL/min/1.73m2 Final     Comment:     Effective December 21, 2021 eGFRcr in adults is calculated using the 2021 CKD-EPI creatinine equation which includes age and gender (Skye et al., NEJM, DOI: 10.1056/TBNRvs9387644)     Calcium   Date Value Ref Range Status   11/08/2022 8.8 8.2 - 9.6 mg/dL Final                   Electronically signed by    Qing Jacobsen MD

## 2022-11-29 ENCOUNTER — TRANSITIONAL CARE UNIT VISIT (OUTPATIENT)
Dept: GERIATRICS | Facility: CLINIC | Age: 87
End: 2022-11-29
Payer: COMMERCIAL

## 2022-11-29 VITALS
TEMPERATURE: 97.5 F | OXYGEN SATURATION: 95 % | SYSTOLIC BLOOD PRESSURE: 113 MMHG | HEART RATE: 63 BPM | RESPIRATION RATE: 18 BRPM | DIASTOLIC BLOOD PRESSURE: 48 MMHG | BODY MASS INDEX: 23.99 KG/M2 | HEIGHT: 65 IN | WEIGHT: 144 LBS

## 2022-11-29 DIAGNOSIS — S42.211D CLOSED DISPLACED FRACTURE OF SURGICAL NECK OF RIGHT HUMERUS WITH ROUTINE HEALING, UNSPECIFIED FRACTURE MORPHOLOGY, SUBSEQUENT ENCOUNTER: ICD-10-CM

## 2022-11-29 DIAGNOSIS — D47.2 MONOCLONAL GAMMOPATHY PRESENT ON SERUM PROTEIN ELECTROPHORESIS: ICD-10-CM

## 2022-11-29 DIAGNOSIS — S32.591D CLOSED FRACTURE OF MULTIPLE PUBIC RAMI, RIGHT, WITH ROUTINE HEALING, SUBSEQUENT ENCOUNTER: Primary | ICD-10-CM

## 2022-11-29 PROCEDURE — 99305 1ST NF CARE MODERATE MDM 35: CPT | Performed by: FAMILY MEDICINE

## 2022-11-29 NOTE — LETTER
11/29/2022        RE: Henrietta Mitchell  [address] [place] [address]        [facility] GERIATRIC SERVICES       Patient Henrietta Mitchell  MRN: [id number]        Reason for Visit     Chief Complaint   Patient presents with     RECHECK     INITIAL       Code Status     DNR / DNI    Assessment     History of a mechanical fall  Closed displaced fracture of the surgical neck of the right humerus  Close displaced fracture of the superior and inferior pubic rami on the right  Osteoporoses  MGUS  GERD  Generalized weakness    Plan     Pt is admitted to TCU for strengthening and rehab.  Presented with multiple fractures.  Right humeral surgical neck fracture to be treated conservatively.  Nonweightbearing on right upper extremity.  Right superior and inferior pubic rami fracture also being treated conservatively no surgical treatment.  Continue weightbearing as tolerated.  Pain control using Tylenol and narcotics.  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  She had a right humeral surgical neck fracture.  Orthopedics evaluated patient and did not recommend surgical intervention  Also noted to have fracture of the inferior and superior pubic rami on the right side of her pelvis.  Again given conservative treatment.  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  remeron scheduled  Due to weakness discharged to the TCU has underlying history  "of MGUS and will continue her treatments with her oncologist if desired    Past Medical & Surgical History       Hyperlipidemia E78.5     Osteoporosis, alendronate added 5/10 M81.0     Diverticulosis of Colon (without Mention of Hemorrhage), colonoscopy  K57.30     Primary osteoarthritis of both knees M17.0     Overactive bladder N32.81     Digital mucous cyst of the left thumb, distal phalanx M71.30     Severe back pain M54.9     RBBB I45.10       Past Social History     Reviewed,     Tobacco Use     Smoking status: Never Smoker     Smokeless tobacco: Never Used   Vaping Use     Vaping Use: Never used   Substance Use Topics     Alcohol use: Yes   Alcohol/week: 1.0 standard drink   Types: 1 Glasses of wine per week   Comment: socially     Drug use: No       Family History     Reviewed, and mother had hip fracture and  from pneumonia complications    Medication List   Post Discharge Medication Reconciliation Status: Post Discharge Medication Reconciliation Status: discharge medications reconciled and changed, per note/orders.  Current Outpatient Medications   Medication     acetaminophen (TYLENOL) 500 MG tablet     aspirin (ASA) 81 MG chewable tablet     bisacodyl (DULCOLAX) 10 MG suppository     calcium carbonate (OS-TENZIN) 1500 (600 Ca) MG tablet     cholecalciferol (VITAMIN D3) 125 mcg (5000 units) capsule     mirtazapine (REMERON) 15 MG tablet     omeprazole (PRILOSEC OTC) 20 MG EC tablet     oxyCODONE (ROXICODONE) 5 MG tablet     senna-docusate (SENOKOT-S/PERICOLACE) 8.6-50 MG tablet     No current facility-administered medications for this visit.          Allergies     Allergies   Allergen Reactions     Amoxicillin      Cefuroxime      Other reaction(s): *Unknown  Pt does not remember, but says \"it made me sick\" in 2018 and 2019     Cephalexin      Other reaction(s): Stomach Upset     Mannitol      Penicillin G      Water, Sterile      Zoledronic Acid Other (See Comments)     Bladder Problems      " "Penicillins Hives and Rash     Sulfabenzamide Rash     Tetracycline Rash       Review of Systems   A comprehensive review of 14 systems was done. Pertinent findings noted here and in history of present illness. All the rest negative.  Constitutional: Negative.  Negative for fever, chills, she has  activity change, appetite change and fatigue.   HENT: Negative for congestion and facial swelling.    Eyes: Negative for photophobia, redness and visual disturbance.   Respiratory: Negative for cough and chest tightness.    Cardiovascular: Negative for chest pain, palpitations and leg swelling.   Gastrointestinal: Negative for nausea, diarrhea, reports ongoing constipation,no  blood in stool and abdominal distention.   Genitourinary: Negative.    Musculoskeletal: Having difficulty maintaining her nonweightbearing status on right upper extremity.  Using a hemiwalker for ambulation.  Pain in the hip is more related to movement  Skin: Negative.    Neurological: Negative for dizziness, tremors, syncope, weakness, light-headedness and headaches.   Hematological: Does not bruise/bleed easily.   Psychiatric/Behavioral: Negative.  Has some insomnia concerns      Physical Exam   /48   Pulse 63   Temp 97.5  F (36.4  C)   Resp 18   Ht 1.651 m (5' 5\")   Wt 65.3 kg (144 lb)   SpO2 95%   BMI 23.96 kg/m       Constitutional: Oriented to person, place, and time and appears well-developed.   HEENT:  Normocephalic and atraumatic.  Eyes: Conjunctivae and EOM are normal. Pupils are equal, round, and reactive to light. No discharge.  No scleral icterus. Nose normal. Mouth/Throat: Oropharynx is clear and moist. No oropharyngeal exudate.    NECK: Normal range of motion. Neck supple. No JVD present. No tracheal deviation present. No thyromegaly present.   CARDIOVASCULAR: Normal rate, regular rhythm and intact distal pulses.  Exam reveals no gallop and no friction rub.  Systolic murmur present.  PULMONARY: Effort normal and breath " sounds normal. No respiratory distress.No Wheezing or rales.  ABDOMEN: Soft. Bowel sounds are normal. No distension and no mass.  There is no tenderness. There is no rebound and no guarding. No HSM.  MUSCULOSKELETAL: Normal range of motion. No edema and no tenderness. Mild kyphosis, no tenderness.  Right upper extremity is in a sling.  Right hip is tender  LYMPH NODES: Has no cervical, supraclavicular, axillary and groin adenopathy.   NEUROLOGICAL: Alert and oriented to person, place, and time. No cranial nerve deficit.  Normal muscle tone. Coordination normal.   GENITOURINARY: Deferred exam.  SKIN: Skin is warm and dry. No rash noted. No erythema. No pallor.   EXTREMITIES: No cyanosis, no clubbing, no edema. No Deformity.  PSYCHIATRIC: Normal mood, affect and behavior.      Lab Results     Last Comprehensive Metabolic Panel:  Sodium   Date Value Ref Range Status   11/08/2022 134 (L) 136 - 145 mmol/L Final     Potassium   Date Value Ref Range Status   11/08/2022 4.7 3.4 - 5.3 mmol/L Final     Chloride   Date Value Ref Range Status   11/08/2022 100 98 - 107 mmol/L Final     Carbon Dioxide (CO2)   Date Value Ref Range Status   11/08/2022 17 (L) 22 - 29 mmol/L Final     Anion Gap   Date Value Ref Range Status   11/08/2022 17 (H) 7 - 15 mmol/L Final     Glucose   Date Value Ref Range Status   11/08/2022 168 (H) 70 - 99 mg/dL Final     Urea Nitrogen   Date Value Ref Range Status   11/08/2022 19.5 8.0 - 23.0 mg/dL Final     Creatinine   Date Value Ref Range Status   11/08/2022 0.68 0.51 - 0.95 mg/dL Final     GFR Estimate   Date Value Ref Range Status   11/08/2022 80 >60 mL/min/1.73m2 Final     Comment:     Effective December 21, 2021 eGFRcr in adults is calculated using the 2021 CKD-EPI creatinine equation which includes age and gender (Syke et al., NEJM, DOI: 10.1056/TEYFkv1529725)     Calcium   Date Value Ref Range Status   11/08/2022 8.8 8.2 - 9.6 mg/dL Final                   Electronically signed by    Qing Jacobsen  MD                             Sincerely,        RADHA Garcia

## 2022-12-09 ENCOUNTER — DISCHARGE SUMMARY NURSING HOME (OUTPATIENT)
Dept: GERIATRICS | Facility: CLINIC | Age: 87
End: 2022-12-09
Payer: COMMERCIAL

## 2022-12-09 VITALS
DIASTOLIC BLOOD PRESSURE: 53 MMHG | HEIGHT: 65 IN | OXYGEN SATURATION: 94 % | HEART RATE: 65 BPM | BODY MASS INDEX: 24.32 KG/M2 | SYSTOLIC BLOOD PRESSURE: 122 MMHG | TEMPERATURE: 97.6 F | WEIGHT: 146 LBS | RESPIRATION RATE: 18 BRPM

## 2022-12-09 DIAGNOSIS — R53.81 PHYSICAL DECONDITIONING: ICD-10-CM

## 2022-12-09 DIAGNOSIS — S32.591D CLOSED FRACTURE OF MULTIPLE PUBIC RAMI, RIGHT, WITH ROUTINE HEALING, SUBSEQUENT ENCOUNTER: Primary | ICD-10-CM

## 2022-12-09 DIAGNOSIS — F41.9 ANXIETY: ICD-10-CM

## 2022-12-09 DIAGNOSIS — R52 PAIN MANAGEMENT: ICD-10-CM

## 2022-12-09 PROCEDURE — 99316 NF DSCHRG MGMT 30 MIN+: CPT | Performed by: NURSE PRACTITIONER

## 2022-12-09 RX ORDER — POLYETHYLENE GLYCOL 3350 17 G/17G
17 POWDER, FOR SOLUTION ORAL DAILY
COMMUNITY

## 2022-12-09 NOTE — PROGRESS NOTES
ED [facility] GERIATRIC SERVICES  Chief Complaint   Patient presents with     Discharge Summary [facility] Medical Record Number:  [id number]  Place of Service where encounter took place:  [facility] ([facility]) [[id number]]  Code Status:  DNR    HISTORY:      HPI:  Henrietta Mitchell  is 89 year old (11/30/1927) undergoing physical and occupational therapy. She is  with a history of osteoporosis and MGUS admitted 10/31/2022 following mechanical fall. She was diagnosed with a pelvic ring fracture and right humerus fracture. The humerus was splinted in the ED. Ortho saw her and felt both fractures were not amenable to surgical fixation.     She developed some urinary retention while on narcotics, and dysuria later on. UA was abnormal and she was started on Ciprofloxacin for acute cystitis.       Today she was seen to review VS, Labs, Routine visit.  She denied chest pain shortness of breath cough or congestion.  She denies any numbness tingling 3+ pulse right upper extremity. She denies any constipation or diarrhea.Her pain is controlled. Possible discharge next week. She will follow up with Orthopedics on 12/12/22.    ALLERGIES:Amoxicillin; Cefuroxime; Cephalexin; Mannitol; Penicillin g; Water, sterile; Zoledronic acid; Penicillins; Sulfabenzamide; and Tetracycline    PAST MEDICAL HISTORY: History reviewed. No pertinent past medical history.    PAST SURGICAL HISTORY:   has no past surgical history on file.    FAMILY HISTORY: family history is not on file.    SOCIAL HISTORY:      ROS:  Constitutional: Negative for activity change, appetite change, fatigue and fever.  Nonweightbearing right upper extremity  HENT: Negative for congestion.    Respiratory: Negative for cough, shortness of breath and wheezing.    Cardiovascular: Negative for chest pain and leg swelling.   Gastrointestinal: Negative for abdominal distention, abdominal pain,  negative constipation, diarrhea and nausea.   Genitourinary: Negative  "for dysuria.   Musculoskeletal: Positive for arthralgia. Negative for back pain.   Skin: Negative for color change and wound.   Neurological: Negative for dizziness.   Psychiatric/Behavioral: Negative for agitation, behavioral problems and confusion.  Insomnia on Remeron with relief   Physical Exam:  Constitutional:       Appearance: Patient is well-developed.   HENT:      Head: Normocephalic.   Eyes:      Conjunctiva/sclera: Conjunctivae normal.   Neck:      Musculoskeletal: Normal range of motion.   Cardiovascular:      Rate and Rhythm: Normal rate and regular rhythm.      Heart sounds: Normal heart sounds. No murmur.   Pulmonary:      Effort: No respiratory distress.      Breath sounds: Normal breath sounds. No wheezing or rales.   Abdominal:      General: Bowel sounds are normal. There is no distension.      Palpations: Abdomen is soft.      Tenderness: There is no abdominal tenderness.   Musculoskeletal:       Normal range of motion.     Skin:General:        Skin is warm.   Neurological:         Mental Status: Patient is alert and oriented to person, place, and time.   Psychiatric:         Behavior: Behavior normal.     Vitals:/53   Pulse 65   Temp 97.6  F (36.4  C)   Resp 18   Ht 1.651 m (5' 5\")   Wt 66.2 kg (146 lb)   SpO2 94%   BMI 24.30 kg/m   and Body mass index is 24.3 kg/m .    Lab/Diagnostic data:   No results found for this or any previous visit (from the past 240 hour(s)).    MEDICATIONS:     Review of your medicines          Accurate as of December 9, 2022  9:30 AM. If you have any questions, ask your nurse or doctor.            CONTINUE these medicines which have NOT CHANGED      Dose / Directions   acetaminophen 500 MG tablet  Commonly known as: TYLENOL      Dose: 1,000 mg  Take 1,000 mg by mouth every 6 hours as needed  Refills: 0     aspirin 81 MG chewable tablet  Commonly known as: ASA      Dose: 81 mg  Take 81 mg by mouth 2 times daily  Refills: 0     bisacodyl 10 MG " suppository  Commonly known as: DULCOLAX      Dose: 10 mg  Place 10 mg rectally daily as needed for constipation  Refills: 0     calcium carbonate 1500 (600 Ca) MG tablet  Commonly known as: OS-TENZIN      Dose: 1,200 mg  Take 1,200 mg by mouth daily  Refills: 0     cholecalciferol 125 mcg (5000 units) capsule  Commonly known as: VITAMIN D3      Take by mouth daily  Refills: 0     mirtazapine 15 MG tablet  Commonly known as: REMERON      Dose: 3.75 mg  Take 3.75 mg by mouth At Bedtime  Refills: 0     omeprazole 20 MG EC tablet  Commonly known as: priLOSEC OTC      Dose: 20 mg  Take 20 mg by mouth 2 times daily  Refills: 0     oxyCODONE 5 MG tablet  Commonly known as: ROXICODONE  Used for: Pain management      Dose: 2.5 mg  Take 0.5 tablets (2.5 mg) by mouth every 4 hours as needed  Quantity: 42 tablet  Refills: 0     polyethylene glycol 17 g packet  Commonly known as: MIRALAX      Dose: 17 g  Take 17 g by mouth daily  Refills: 0     senna-docusate 8.6-50 MG tablet  Commonly known as: SENOKOT-S/PERICOLACE      Dose: 1 tablet  Take 1 tablet by mouth 2 times daily  Refills: 0            ASSESSMENT/PLAN  Encounter Diagnoses   Name Primary?     Closed fracture of multiple pubic rami, right, with routine healing, subsequent encounter Yes     Physical deconditioning      Anxiety      Pain management      Right humerus fracture- non operative, Follow up with Orthopedics and pain management     Pain management Tylenol PRN, PRN Oxycodone    Insomnia/anxiety-  Remeron to scheduled at HS    Physical deconditioning PT/OT    GERD- On Omeprazole      Electronically signed by: Cailin Delarosa CNP

## 2022-12-09 NOTE — LETTER
12/9/2022        RE: Henrietta Mitchell  [address] [place] [address]        ED [facility] GERIATRIC SERVICES  Chief Complaint   Patient presents with     Discharge Summary [facility] Medical Record Number:  [id number]  Place of Service where encounter took place:  [facility] ([facility]) [[id number]]  Code Status:  DNR    HISTORY:      HPI:  Henrietta Mitchell  is 89 year old (11/30/1927) undergoing physical and occupational therapy. She is  with a history of osteoporosis and MGUS admitted 10/31/2022 following mechanical fall. She was diagnosed with a pelvic ring fracture and right humerus fracture. The humerus was splinted in the ED. Ortho saw her and felt both fractures were not amenable to surgical fixation.     She developed some urinary retention while on narcotics, and dysuria later on. UA was abnormal and she was started on Ciprofloxacin for acute cystitis.       Today she was seen to review VS, Labs, Routine visit.  She denied chest pain shortness of breath cough or congestion.  She denies any numbness tingling 3+ pulse right upper extremity. She denies any constipation or diarrhea.Her pain is controlled. Possible discharge next week. She will follow up with Orthopedics on 12/12/22.    ALLERGIES:Amoxicillin; Cefuroxime; Cephalexin; Mannitol; Penicillin g; Water, sterile; Zoledronic acid; Penicillins; Sulfabenzamide; and Tetracycline    PAST MEDICAL HISTORY: History reviewed. No pertinent past medical history.    PAST SURGICAL HISTORY:   has no past surgical history on file.    FAMILY HISTORY: family history is not on file.    SOCIAL HISTORY:      ROS:  Constitutional: Negative for activity change, appetite change, fatigue and fever.  Nonweightbearing right upper extremity  HENT: Negative for congestion.    Respiratory: Negative for cough, shortness of breath and wheezing.    Cardiovascular: Negative for chest pain and leg swelling.   Gastrointestinal: Negative for abdominal  "distention, abdominal pain,  negative constipation, diarrhea and nausea.   Genitourinary: Negative for dysuria.   Musculoskeletal: Positive for arthralgia. Negative for back pain.   Skin: Negative for color change and wound.   Neurological: Negative for dizziness.   Psychiatric/Behavioral: Negative for agitation, behavioral problems and confusion.  Insomnia on Remeron with relief   Physical Exam:  Constitutional:       Appearance: Patient is well-developed.   HENT:      Head: Normocephalic.   Eyes:      Conjunctiva/sclera: Conjunctivae normal.   Neck:      Musculoskeletal: Normal range of motion.   Cardiovascular:      Rate and Rhythm: Normal rate and regular rhythm.      Heart sounds: Normal heart sounds. No murmur.   Pulmonary:      Effort: No respiratory distress.      Breath sounds: Normal breath sounds. No wheezing or rales.   Abdominal:      General: Bowel sounds are normal. There is no distension.      Palpations: Abdomen is soft.      Tenderness: There is no abdominal tenderness.   Musculoskeletal:       Normal range of motion.     Skin:General:        Skin is warm.   Neurological:         Mental Status: Patient is alert and oriented to person, place, and time.   Psychiatric:         Behavior: Behavior normal.     Vitals:/53   Pulse 65   Temp 97.6  F (36.4  C)   Resp 18   Ht 1.651 m (5' 5\")   Wt 66.2 kg (146 lb)   SpO2 94%   BMI 24.30 kg/m   and Body mass index is 24.3 kg/m .    Lab/Diagnostic data:   No results found for this or any previous visit (from the past 240 hour(s)).    MEDICATIONS:     Review of your medicines          Accurate as of December 9, 2022  9:30 AM. If you have any questions, ask your nurse or doctor.            CONTINUE these medicines which have NOT CHANGED      Dose / Directions   acetaminophen 500 MG tablet  Commonly known as: TYLENOL      Dose: 1,000 mg  Take 1,000 mg by mouth every 6 hours as needed  Refills: 0     aspirin 81 MG chewable tablet  Commonly known as: " ASA      Dose: 81 mg  Take 81 mg by mouth 2 times daily  Refills: 0     bisacodyl 10 MG suppository  Commonly known as: DULCOLAX      Dose: 10 mg  Place 10 mg rectally daily as needed for constipation  Refills: 0     calcium carbonate 1500 (600 Ca) MG tablet  Commonly known as: OS-TENIZN      Dose: 1,200 mg  Take 1,200 mg by mouth daily  Refills: 0     cholecalciferol 125 mcg (5000 units) capsule  Commonly known as: VITAMIN D3      Take by mouth daily  Refills: 0     mirtazapine 15 MG tablet  Commonly known as: REMERON      Dose: 3.75 mg  Take 3.75 mg by mouth At Bedtime  Refills: 0     omeprazole 20 MG EC tablet  Commonly known as: priLOSEC OTC      Dose: 20 mg  Take 20 mg by mouth 2 times daily  Refills: 0     oxyCODONE 5 MG tablet  Commonly known as: ROXICODONE  Used for: Pain management      Dose: 2.5 mg  Take 0.5 tablets (2.5 mg) by mouth every 4 hours as needed  Quantity: 42 tablet  Refills: 0     polyethylene glycol 17 g packet  Commonly known as: MIRALAX      Dose: 17 g  Take 17 g by mouth daily  Refills: 0     senna-docusate 8.6-50 MG tablet  Commonly known as: SENOKOT-S/PERICOLACE      Dose: 1 tablet  Take 1 tablet by mouth 2 times daily  Refills: 0            ASSESSMENT/PLAN  Encounter Diagnoses   Name Primary?     Closed fracture of multiple pubic rami, right, with routine healing, subsequent encounter Yes     Physical deconditioning      Anxiety      Pain management      Right humerus fracture- non operative, Follow up with Orthopedics and pain management     Pain management Tylenol PRN, PRN Oxycodone    Insomnia/anxiety-  Remeron to scheduled at [facility]    Physical deconditioning PT/OT    GERD- On Omeprazole      Electronically signed by: Cailin Delarosa CNP        Sincerely,        Cailin Delarosa CNP

## 2022-12-12 ENCOUNTER — DISCHARGE SUMMARY NURSING HOME (OUTPATIENT)
Dept: GERIATRICS | Facility: CLINIC | Age: 87
End: 2022-12-12
Payer: COMMERCIAL

## 2022-12-12 VITALS
DIASTOLIC BLOOD PRESSURE: 53 MMHG | HEART RATE: 74 BPM | OXYGEN SATURATION: 97 % | BODY MASS INDEX: 24.16 KG/M2 | TEMPERATURE: 98.1 F | WEIGHT: 145 LBS | SYSTOLIC BLOOD PRESSURE: 115 MMHG | HEIGHT: 65 IN | RESPIRATION RATE: 18 BRPM

## 2022-12-12 DIAGNOSIS — S42.211D CLOSED DISPLACED FRACTURE OF SURGICAL NECK OF RIGHT HUMERUS WITH ROUTINE HEALING, UNSPECIFIED FRACTURE MORPHOLOGY, SUBSEQUENT ENCOUNTER: ICD-10-CM

## 2022-12-12 DIAGNOSIS — R53.81 PHYSICAL DECONDITIONING: ICD-10-CM

## 2022-12-12 DIAGNOSIS — S32.591D CLOSED FRACTURE OF MULTIPLE PUBIC RAMI, RIGHT, WITH ROUTINE HEALING, SUBSEQUENT ENCOUNTER: Primary | ICD-10-CM

## 2022-12-12 DIAGNOSIS — R52 PAIN MANAGEMENT: ICD-10-CM

## 2022-12-12 PROCEDURE — 99316 NF DSCHRG MGMT 30 MIN+: CPT | Performed by: NURSE PRACTITIONER

## 2022-12-12 NOTE — PROGRESS NOTES
ED [facility] GERIATRIC SERVICES  Chief Complaint   Patient presents with     Discharge Summary [facility] Medical Record Number:  [id number]  Place of Service where encounter took place:  No question data found.  Code Status:  DNR    HISTORY:      HPI:  Henrietta Mitchell  is 89 year old (11/30/1927) undergoing physical and occupational therapy. She is  with a history of osteoporosis and MGUS admitted 10/31/2022 following mechanical fall. She was diagnosed with a pelvic ring fracture and right humerus fracture. The humerus was splinted in the ED. Ortho saw her and felt both fractures were not amenable to surgical fixation.     She developed some urinary retention while on narcotics, and dysuria later on. UA was abnormal and she was started on Ciprofloxacin for acute cystitis.       Today she was seen to review VS, Labs, Routine visit and a face to face for discharge. She will discharge to [facility] with current medications and treatments. She will have Home care services PT/OT/HHA.  She denied chest pain shortness of breath cough or congestion.  She denies any numbness tingling 3+ pulse right upper extremity. She denies any constipation or diarrhea.Her pain is controlled. Possible discharge next week.  She did follow-up  up with orthopedics today in remains nonweightbearing right upper extremity however she can have her sling off for elbow wrist and digit range of motion along with Codman's and pendulum exercises.  She will follow-up with orthopedics in 4 weeks      ALLERGIES:Amoxicillin; Cefuroxime; Cephalexin; Mannitol; Penicillin g; Water, sterile; Zoledronic acid; Penicillins; Sulfabenzamide; and Tetracycline    PAST MEDICAL HISTORY: No past medical history on file.    PAST SURGICAL HISTORY:   has no past surgical history on file.    FAMILY HISTORY: family history is not on file.    SOCIAL HISTORY:      ROS:  Constitutional: Negative for activity change, appetite change, fatigue and fever.   Nonweightbearing right upper extremity  HENT: Negative for congestion.    Respiratory: Negative for cough, shortness of breath and wheezing.    Cardiovascular: Negative for chest pain and leg swelling.   Gastrointestinal: Negative for abdominal distention, abdominal pain,  negative constipation, diarrhea and nausea.   Genitourinary: Negative for dysuria.   Musculoskeletal: Positive for arthralgia. Negative for back pain.   Skin: Negative for color change and wound.   Neurological: Negative for dizziness.   Psychiatric/Behavioral: Negative for agitation, behavioral problems and confusion.  Insomnia on Remeron with relief   Physical Exam:  Constitutional:       Appearance: Patient is well-developed.   HENT:      Head: Normocephalic.   Eyes:      Conjunctiva/sclera: Conjunctivae normal.   Neck:      Musculoskeletal: Normal range of motion.   Cardiovascular:      Rate and Rhythm: Normal rate and regular rhythm.      Heart sounds: Normal heart sounds. No murmur.   Pulmonary:      Effort: No respiratory distress.      Breath sounds: Normal breath sounds. No wheezing or rales.   Abdominal:      General: Bowel sounds are normal. There is no distension.      Palpations: Abdomen is soft.      Tenderness: There is no abdominal tenderness.   Musculoskeletal:       Normal range of motion.     Skin:General:        Skin is warm.   Neurological:         Mental Status: Patient is alert and oriented to person, place, and time.   Psychiatric:         Behavior: Behavior normal.     Vitals:There were no vitals taken for this visit. and There is no height or weight on file to calculate BMI.    Lab/Diagnostic data:   No results found for this or any previous visit (from the past 240 hour(s)).    MEDICATIONS:     Review of your medicines          Accurate as of December 12, 2022  7:42 AM. If you have any questions, ask your nurse or doctor.            CONTINUE these medicines which have NOT CHANGED      Dose / Directions   acetaminophen  500 MG tablet  Commonly known as: TYLENOL      Dose: 1,000 mg  Take 1,000 mg by mouth every 6 hours as needed  Refills: 0     aspirin 81 MG chewable tablet  Commonly known as: ASA      Dose: 81 mg  Take 81 mg by mouth 2 times daily  Refills: 0     bisacodyl 10 MG suppository  Commonly known as: DULCOLAX      Dose: 10 mg  Place 10 mg rectally daily as needed for constipation  Refills: 0     calcium carbonate 1500 (600 Ca) MG tablet  Commonly known as: OS-TENZIN      Dose: 1,200 mg  Take 1,200 mg by mouth daily  Refills: 0     cholecalciferol 125 mcg (5000 units) capsule  Commonly known as: VITAMIN D3      Take by mouth daily  Refills: 0     mirtazapine 15 MG tablet  Commonly known as: REMERON      Dose: 3.75 mg  Take 3.75 mg by mouth At Bedtime  Refills: 0     omeprazole 20 MG EC tablet  Commonly known as: priLOSEC OTC      Dose: 20 mg  Take 20 mg by mouth 2 times daily  Refills: 0     oxyCODONE 5 MG tablet  Commonly known as: ROXICODONE  Used for: Pain management      Dose: 2.5 mg  Take 0.5 tablets (2.5 mg) by mouth every 4 hours as needed  Quantity: 42 tablet  Refills: 0     polyethylene glycol 17 g packet  Commonly known as: MIRALAX      Dose: 17 g  Take 17 g by mouth daily  Refills: 0     senna-docusate 8.6-50 MG tablet  Commonly known as: SENOKOT-S/PERICOLACE      Dose: 1 tablet  Take 1 tablet by mouth 2 times daily  Refills: 0            ASSESSMENT/PLAN  Encounter Diagnoses   Name Primary?     Closed fracture of multiple pubic rami, right, with routine healing, subsequent encounter Yes     Physical deconditioning      Pain management      Closed displaced fracture of surgical neck of right humerus with routine healing, unspecified fracture morphology, subsequent encounter      Right humerus fracture- non operative, Follow up with Orthopedics and pain management seen by orthopedics today 12/12/2022 okay for elbow wrist and digit range of motion along with Codman's and pendulum exercises    Pain management Tylenol  PRN, PRN Oxycodone    Insomnia/anxiety-  Remeron to scheduled at     Physical deconditioning she will have home care services PT OT home health aide    GERD- On Omeprazole    DISCHARGE PLAN/FACE TO FACE:  I certify that services are/were furnished while this patient was under the care of a physician and that a physician or an allowed non-physician practitioner (NPP), had a face-to-face encounter that meets the physician face-to-face encounter requirements. The encounter was in whole, or in part, related to the primary reason for home health. The patient is confined to his/her home and needs intermittent skilled nursing, physical therapy, speech-language pathology, or the continued need for occupational therapy. A plan of care has been established by a physician and is periodically reviewed by a physician.  Date of Face-to-Face Encounter: 12/12/22    I certify that, based on my findings, the following services are medically necessary home health services: PT/OT/HHA    My clinical findings support the need for the above skilled services because: PT/OT for continued strength and  Endurance, HHA to assist with ADl's    This patient is homebound because:SHe is deconditioned following non operative pelvic fracture and right humerus fracture continues to remain nonweightbearing right upper extremity and weightbearing as tolerated lower extremities    The patient is, or has been, under my care and I have initiated the establishment of the plan of care. This patient will be followed by a physician who will periodically review the plan of care.    Schedule follow up visit with primary care provider within 7 days to reestablish care.    Electronically signed by: Cailin Delarosa CNP

## 2024-10-18 ENCOUNTER — LAB REQUISITION (OUTPATIENT)
Dept: LAB | Facility: CLINIC | Age: 89
End: 2024-10-18
Payer: COMMERCIAL

## 2024-10-18 DIAGNOSIS — N39.0 URINARY TRACT INFECTION, SITE NOT SPECIFIED: ICD-10-CM

## 2024-10-18 LAB
ALBUMIN UR-MCNC: NEGATIVE MG/DL
APPEARANCE UR: ABNORMAL
BACTERIA #/AREA URNS HPF: ABNORMAL /HPF
BILIRUB UR QL STRIP: NEGATIVE
COLOR UR AUTO: ABNORMAL
GLUCOSE UR STRIP-MCNC: NEGATIVE MG/DL
HGB UR QL STRIP: ABNORMAL
KETONES UR STRIP-MCNC: NEGATIVE MG/DL
LEUKOCYTE ESTERASE UR QL STRIP: ABNORMAL
MUCOUS THREADS #/AREA URNS LPF: PRESENT /LPF
NITRATE UR QL: POSITIVE
PH UR STRIP: 7 [PH] (ref 5–7)
RBC URINE: 9 /HPF
SP GR UR STRIP: 1.01 (ref 1–1.03)
SQUAMOUS EPITHELIAL: <1 /HPF
TRANSITIONAL EPI: <1 /HPF
UROBILINOGEN UR STRIP-MCNC: NORMAL MG/DL
WBC URINE: 122 /HPF

## 2024-10-18 PROCEDURE — 81001 URINALYSIS AUTO W/SCOPE: CPT | Mod: ORL | Performed by: NURSE PRACTITIONER

## 2024-10-18 PROCEDURE — 87186 SC STD MICRODIL/AGAR DIL: CPT | Mod: ORL | Performed by: NURSE PRACTITIONER

## 2024-10-19 LAB — BACTERIA UR CULT: ABNORMAL

## 2024-10-28 ENCOUNTER — LAB REQUISITION (OUTPATIENT)
Dept: LAB | Facility: CLINIC | Age: 89
End: 2024-10-28
Payer: COMMERCIAL

## 2024-10-28 DIAGNOSIS — R35.0 FREQUENCY OF MICTURITION: ICD-10-CM

## 2024-10-28 PROCEDURE — 87186 SC STD MICRODIL/AGAR DIL: CPT | Mod: ORL | Performed by: HOSPITALIST

## 2024-10-28 PROCEDURE — 81001 URINALYSIS AUTO W/SCOPE: CPT | Mod: ORL | Performed by: HOSPITALIST

## 2024-10-29 LAB
ALBUMIN UR-MCNC: NEGATIVE MG/DL
APPEARANCE UR: CLEAR
BACTERIA #/AREA URNS HPF: ABNORMAL /HPF
BILIRUB UR QL STRIP: NEGATIVE
COLOR UR AUTO: ABNORMAL
GLUCOSE UR STRIP-MCNC: NEGATIVE MG/DL
HGB UR QL STRIP: ABNORMAL
KETONES UR STRIP-MCNC: NEGATIVE MG/DL
LEUKOCYTE ESTERASE UR QL STRIP: ABNORMAL
MUCOUS THREADS #/AREA URNS LPF: PRESENT /LPF
NITRATE UR QL: POSITIVE
PH UR STRIP: 5.5 [PH] (ref 5–7)
RBC URINE: 1 /HPF
SP GR UR STRIP: 1.01 (ref 1–1.03)
UROBILINOGEN UR STRIP-MCNC: NORMAL MG/DL
WBC URINE: 11 /HPF

## 2024-10-30 LAB — BACTERIA UR CULT: ABNORMAL
